# Patient Record
Sex: MALE | Race: WHITE | NOT HISPANIC OR LATINO | Employment: FULL TIME | URBAN - METROPOLITAN AREA
[De-identification: names, ages, dates, MRNs, and addresses within clinical notes are randomized per-mention and may not be internally consistent; named-entity substitution may affect disease eponyms.]

---

## 2023-04-17 ENCOUNTER — OFFICE VISIT (OUTPATIENT)
Dept: PRIMARY CARE | Facility: CLINIC | Age: 63
End: 2023-04-17
Payer: COMMERCIAL

## 2023-04-17 VITALS
BODY MASS INDEX: 32.64 KG/M2 | DIASTOLIC BLOOD PRESSURE: 80 MMHG | WEIGHT: 228 LBS | SYSTOLIC BLOOD PRESSURE: 138 MMHG | HEIGHT: 70 IN

## 2023-04-17 DIAGNOSIS — H60.541 ECZEMATOID OTITIS EXTERNA OF RIGHT EAR, UNSPECIFIED CHRONICITY: Primary | ICD-10-CM

## 2023-04-17 DIAGNOSIS — F32.1 CURRENT MODERATE EPISODE OF MAJOR DEPRESSIVE DISORDER WITHOUT PRIOR EPISODE (MULTI): ICD-10-CM

## 2023-04-17 DIAGNOSIS — J01.00 ACUTE MAXILLARY SINUSITIS, RECURRENCE NOT SPECIFIED: ICD-10-CM

## 2023-04-17 DIAGNOSIS — S39.012A STRAIN OF LUMBAR REGION, INITIAL ENCOUNTER: ICD-10-CM

## 2023-04-17 PROBLEM — G89.29 CHRONIC BILATERAL LOW BACK PAIN WITHOUT SCIATICA: Status: ACTIVE | Noted: 2023-04-17

## 2023-04-17 PROBLEM — M79.604 PAIN OF RIGHT LOWER EXTREMITY: Status: ACTIVE | Noted: 2023-04-17

## 2023-04-17 PROBLEM — N52.9 ERECTILE DYSFUNCTION: Status: ACTIVE | Noted: 2023-04-17

## 2023-04-17 PROBLEM — S29.019A THORACIC MYOFASCIAL STRAIN: Status: ACTIVE | Noted: 2023-04-17

## 2023-04-17 PROBLEM — E53.8 VITAMIN B12 DEFICIENCY: Status: ACTIVE | Noted: 2023-04-17

## 2023-04-17 PROBLEM — M65.30 TRIGGER FINGER, ACQUIRED: Status: ACTIVE | Noted: 2023-04-17

## 2023-04-17 PROBLEM — R20.2 NUMBNESS AND TINGLING IN LEFT HAND: Status: ACTIVE | Noted: 2023-04-17

## 2023-04-17 PROBLEM — F41.9 ANXIETY: Status: ACTIVE | Noted: 2023-04-17

## 2023-04-17 PROBLEM — R73.01 IMPAIRED FASTING GLUCOSE: Status: ACTIVE | Noted: 2023-04-17

## 2023-04-17 PROBLEM — J32.9 SINUSITIS: Status: ACTIVE | Noted: 2023-04-17

## 2023-04-17 PROBLEM — K21.9 GERD (GASTROESOPHAGEAL REFLUX DISEASE): Status: ACTIVE | Noted: 2023-04-17

## 2023-04-17 PROBLEM — R07.1 CHEST PAIN ON BREATHING: Status: ACTIVE | Noted: 2023-04-17

## 2023-04-17 PROBLEM — R07.89 ATYPICAL CHEST PAIN: Status: ACTIVE | Noted: 2023-04-17

## 2023-04-17 PROBLEM — N40.1 ENLARGED PROSTATE WITH LOWER URINARY TRACT SYMPTOMS (LUTS): Status: ACTIVE | Noted: 2023-04-17

## 2023-04-17 PROBLEM — F32.0 DEPRESSION, MAJOR, SINGLE EPISODE, MILD (CMS-HCC): Status: ACTIVE | Noted: 2023-04-17

## 2023-04-17 PROBLEM — M79.674 PAIN OF TOE OF RIGHT FOOT: Status: ACTIVE | Noted: 2023-04-17

## 2023-04-17 PROBLEM — M25.512 LEFT SHOULDER PAIN: Status: ACTIVE | Noted: 2023-04-17

## 2023-04-17 PROBLEM — M54.16 LUMBAR RADICULOPATHY: Status: ACTIVE | Noted: 2023-04-17

## 2023-04-17 PROBLEM — M54.50 CHRONIC BILATERAL LOW BACK PAIN WITHOUT SCIATICA: Status: ACTIVE | Noted: 2023-04-17

## 2023-04-17 PROBLEM — L30.1 DYSHIDROTIC ECZEMA: Status: ACTIVE | Noted: 2023-04-17

## 2023-04-17 PROBLEM — M10.9 GOUT: Status: ACTIVE | Noted: 2023-04-17

## 2023-04-17 PROBLEM — G57.51 TARSAL TUNNEL SYNDROME OF RIGHT SIDE: Status: ACTIVE | Noted: 2023-04-17

## 2023-04-17 PROBLEM — I10 BENIGN ESSENTIAL HYPERTENSION: Status: ACTIVE | Noted: 2023-04-17

## 2023-04-17 PROBLEM — E78.5 HYPERLIPIDEMIA: Status: ACTIVE | Noted: 2023-04-17

## 2023-04-17 PROBLEM — R20.0 NUMBNESS: Status: ACTIVE | Noted: 2023-04-17

## 2023-04-17 PROBLEM — M94.0 COSTOCHONDRITIS: Status: ACTIVE | Noted: 2023-04-17

## 2023-04-17 PROBLEM — N45.1 EPIDIDYMITIS: Status: ACTIVE | Noted: 2023-04-17

## 2023-04-17 PROBLEM — B35.6 TINEA CRURIS: Status: ACTIVE | Noted: 2023-04-17

## 2023-04-17 PROBLEM — R05.9 COUGH: Status: ACTIVE | Noted: 2023-04-17

## 2023-04-17 PROBLEM — J34.3 HYPERTROPHY OF INFERIOR NASAL TURBINATE: Status: ACTIVE | Noted: 2023-04-17

## 2023-04-17 PROBLEM — R20.2 PARESTHESIA OF FOOT: Status: ACTIVE | Noted: 2023-04-17

## 2023-04-17 PROBLEM — R20.0 NUMBNESS AND TINGLING IN LEFT HAND: Status: ACTIVE | Noted: 2023-04-17

## 2023-04-17 PROBLEM — H26.9 LEFT CATARACT: Status: ACTIVE | Noted: 2023-04-17

## 2023-04-17 PROBLEM — G62.9 PERIPHERAL NEUROPATHY: Status: ACTIVE | Noted: 2023-04-17

## 2023-04-17 PROBLEM — M79.605 PAIN OF LEFT LOWER EXTREMITY: Status: ACTIVE | Noted: 2023-04-17

## 2023-04-17 PROCEDURE — 3075F SYST BP GE 130 - 139MM HG: CPT | Performed by: INTERNAL MEDICINE

## 2023-04-17 PROCEDURE — 3079F DIAST BP 80-89 MM HG: CPT | Performed by: INTERNAL MEDICINE

## 2023-04-17 PROCEDURE — 99214 OFFICE O/P EST MOD 30 MIN: CPT | Performed by: INTERNAL MEDICINE

## 2023-04-17 PROCEDURE — 1036F TOBACCO NON-USER: CPT | Performed by: INTERNAL MEDICINE

## 2023-04-17 RX ORDER — ROSUVASTATIN CALCIUM 10 MG/1
1 TABLET, COATED ORAL DAILY
COMMUNITY
Start: 2017-03-27 | End: 2023-05-08 | Stop reason: SDUPTHER

## 2023-04-17 RX ORDER — BUPROPION HYDROCHLORIDE 300 MG/1
1 TABLET ORAL DAILY
COMMUNITY
Start: 2013-06-10 | End: 2023-05-08 | Stop reason: SDUPTHER

## 2023-04-17 RX ORDER — ALLOPURINOL 300 MG/1
1 TABLET ORAL DAILY
COMMUNITY
Start: 2021-06-04 | End: 2023-05-08 | Stop reason: SDUPTHER

## 2023-04-17 RX ORDER — GABAPENTIN 300 MG/1
CAPSULE ORAL
COMMUNITY
Start: 2022-07-14

## 2023-04-17 RX ORDER — VENLAFAXINE HYDROCHLORIDE 75 MG/1
75 CAPSULE, EXTENDED RELEASE ORAL DAILY
Qty: 30 CAPSULE | Refills: 1 | Status: SHIPPED | OUTPATIENT
Start: 2023-04-17 | End: 2023-05-08 | Stop reason: SDUPTHER

## 2023-04-17 RX ORDER — CARVEDILOL 12.5 MG/1
1 TABLET ORAL
COMMUNITY
Start: 2013-06-10 | End: 2023-05-08 | Stop reason: SDUPTHER

## 2023-04-17 RX ORDER — FLUTICASONE PROPIONATE 50 MCG
2 SPRAY, SUSPENSION (ML) NASAL DAILY
COMMUNITY
Start: 2022-06-21

## 2023-04-17 RX ORDER — CALCIUM CARB/VITAMIN D3/VIT K1 500-500-40
TABLET,CHEWABLE ORAL
COMMUNITY
Start: 2019-10-07

## 2023-04-17 RX ORDER — METHYLPREDNISOLONE 4 MG/1
TABLET ORAL
Qty: 21 TABLET | Refills: 0 | Status: SHIPPED | OUTPATIENT
Start: 2023-04-17 | End: 2023-04-24

## 2023-04-17 RX ORDER — SILDENAFIL 100 MG/1
TABLET, FILM COATED ORAL
COMMUNITY
Start: 2019-02-14

## 2023-04-17 RX ORDER — NEOMYCIN SULFATE, POLYMYXIN B SULFATE, HYDROCORTISONE 3.5; 10000; 1 MG/ML; [USP'U]/ML; MG/ML
2 SOLUTION/ DROPS AURICULAR (OTIC) 3 TIMES DAILY
Qty: 10 ML | Refills: 0 | Status: SHIPPED | OUTPATIENT
Start: 2023-04-17 | End: 2023-04-24

## 2023-04-17 RX ORDER — OMEPRAZOLE 20 MG/1
1 CAPSULE, DELAYED RELEASE ORAL DAILY
COMMUNITY
Start: 2021-12-13 | End: 2023-05-08 | Stop reason: SDUPTHER

## 2023-04-17 RX ORDER — AMOXICILLIN AND CLAVULANATE POTASSIUM 875; 125 MG/1; MG/1
875 TABLET, FILM COATED ORAL 2 TIMES DAILY
Qty: 20 TABLET | Refills: 0 | Status: SHIPPED | OUTPATIENT
Start: 2023-04-17 | End: 2023-04-27

## 2023-04-17 RX ORDER — SERTRALINE HYDROCHLORIDE 50 MG/1
0.5 TABLET, FILM COATED ORAL DAILY
COMMUNITY
Start: 2020-11-11 | End: 2023-05-08 | Stop reason: SDUPTHER

## 2023-04-17 ASSESSMENT — PATIENT HEALTH QUESTIONNAIRE - PHQ9
SUM OF ALL RESPONSES TO PHQ9 QUESTIONS 1 AND 2: 2
1. LITTLE INTEREST OR PLEASURE IN DOING THINGS: SEVERAL DAYS
2. FEELING DOWN, DEPRESSED OR HOPELESS: SEVERAL DAYS

## 2023-04-17 ASSESSMENT — ENCOUNTER SYMPTOMS: BACK PAIN: 1

## 2023-04-17 NOTE — PROGRESS NOTES
Subjective   Patient ID: Durga Romero is a 62 y.o. male who presents for Earache and Back Pain.  His right ear has been hurting, gets so inflammed cant put the phone into it.   3 times in the last few months and really bad  Gets a little drainage from the ear  He has been sore under the right eye   Mild nasal drainage    His back, is walking hunched over for 3 days  He was doing some yard work, but no injury  No pain down the leg,  no numbness  No change in bowels or urine    Mid February, he turned 62 and lost is job, has been very depressed and losing sleep over it.   A lot of anxiety, has been discouraged  At work he go more responsibilities and then they let him go, was able to go on wifes insurance and he was given severance.         Earache     Back Pain        Review of Systems   HENT:  Positive for ear pain.    Musculoskeletal:  Positive for back pain.       Objective     Visit Vitals  /80        Physical Exam  HENT:      Ears:      Comments: Right eac with slight scale but not edematous or discharge     Nose:      Comments: Turbs red b/l  Right side with yellow green discharge  Musculoskeletal:      Comments: Lumbar paraspinal muscles with ropiness and tender to palp   Psychiatric:      Comments: Phq9 is 19         Assessment/Plan     Problem List Items Addressed This Visit    None            There are no Patient Instructions on file for this visit.    For all testing, if you have not received results within 5 business days, please call the office.    All results will be available as well on [employer]. Make sure you have signed up.

## 2023-04-17 NOTE — PATIENT INSTRUCTIONS
Right outer ear infection (eczematous otitis)  Use cortisporin drops, 4 drops right ear 3-4 times a day    Right maxillary sinus infection- take augmentin 875mg twice daily for 10 days  Call if rash or diarrhea    Depression, poorly controlled (PHQ9 16)  Continue bupropion 300mg daily  Add effexor/venlafaxine xr 75mg daily  Recheck in 1 month    Lumbar strain- take medrol sky and do lower back exercises

## 2023-05-08 DIAGNOSIS — F41.9 ANXIETY: ICD-10-CM

## 2023-05-08 DIAGNOSIS — E78.5 HYPERLIPIDEMIA, UNSPECIFIED HYPERLIPIDEMIA TYPE: ICD-10-CM

## 2023-05-08 DIAGNOSIS — K21.9 GASTROESOPHAGEAL REFLUX DISEASE, UNSPECIFIED WHETHER ESOPHAGITIS PRESENT: ICD-10-CM

## 2023-05-08 DIAGNOSIS — I10 BENIGN ESSENTIAL HYPERTENSION: ICD-10-CM

## 2023-05-08 DIAGNOSIS — S39.012A STRAIN OF LUMBAR REGION, INITIAL ENCOUNTER: ICD-10-CM

## 2023-05-08 DIAGNOSIS — F32.1 CURRENT MODERATE EPISODE OF MAJOR DEPRESSIVE DISORDER WITHOUT PRIOR EPISODE (MULTI): ICD-10-CM

## 2023-05-08 RX ORDER — ALLOPURINOL 300 MG/1
300 TABLET ORAL DAILY
Qty: 90 TABLET | Refills: 1 | Status: SHIPPED | OUTPATIENT
Start: 2023-05-08 | End: 2023-10-23

## 2023-05-08 RX ORDER — ROSUVASTATIN CALCIUM 10 MG/1
10 TABLET, COATED ORAL DAILY
Qty: 90 TABLET | Refills: 1 | Status: SHIPPED | OUTPATIENT
Start: 2023-05-08 | End: 2023-10-23

## 2023-05-08 RX ORDER — OMEPRAZOLE 20 MG/1
20 CAPSULE, DELAYED RELEASE ORAL DAILY
Qty: 90 CAPSULE | Refills: 0 | Status: SHIPPED | OUTPATIENT
Start: 2023-05-08 | End: 2023-06-12

## 2023-05-08 RX ORDER — VENLAFAXINE HYDROCHLORIDE 75 MG/1
75 CAPSULE, EXTENDED RELEASE ORAL DAILY
Qty: 30 CAPSULE | Refills: 1 | Status: SHIPPED | OUTPATIENT
Start: 2023-05-08 | End: 2023-08-24

## 2023-05-08 RX ORDER — SERTRALINE HYDROCHLORIDE 50 MG/1
25 TABLET, FILM COATED ORAL DAILY
Qty: 90 TABLET | Refills: 1 | Status: SHIPPED | OUTPATIENT
Start: 2023-05-08 | End: 2023-08-09 | Stop reason: ALTCHOICE

## 2023-05-08 RX ORDER — BUPROPION HYDROCHLORIDE 300 MG/1
300 TABLET ORAL DAILY
Qty: 90 TABLET | Refills: 1 | Status: SHIPPED | OUTPATIENT
Start: 2023-05-08 | End: 2023-10-23

## 2023-05-08 RX ORDER — CARVEDILOL 12.5 MG/1
12.5 TABLET ORAL
Qty: 90 TABLET | Refills: 0 | Status: SHIPPED | OUTPATIENT
Start: 2023-05-08 | End: 2023-06-12

## 2023-05-08 NOTE — TELEPHONE ENCOUNTER
Requested Prescriptions     Pending Prescriptions Disp Refills    venlafaxine XR (Effexor-XR) 75 mg 24 hr capsule 30 capsule 1     Sig: Take 1 capsule (75 mg) by mouth once daily. Take with food.    rosuvastatin (Crestor) 10 mg tablet 90 tablet 1     Sig: Take 1 tablet (10 mg) by mouth once daily.    buPROPion XL (Wellbutrin XL) 300 mg 24 hr tablet 90 tablet 1     Sig: Take 1 tablet (300 mg) by mouth once daily.    carvedilol (Coreg) 12.5 mg tablet 90 tablet 0     Sig: Take 1 tablet (12.5 mg) by mouth 2 times a day with meals.    omeprazole (PriLOSEC) 20 mg DR capsule 90 capsule 0     Sig: Take 1 capsule (20 mg) by mouth once daily.    allopurinol (Zyloprim) 300 mg tablet 90 tablet 1     Sig: Take 1 tablet (300 mg) by mouth once daily.    sertraline (Zoloft) 50 mg tablet 90 tablet 1     Sig: Take 0.5 tablets (25 mg) by mouth once daily.

## 2023-05-10 ENCOUNTER — HOSPITAL ENCOUNTER (OUTPATIENT)
Dept: DATA CONVERSION | Facility: HOSPITAL | Age: 63
End: 2023-05-10
Attending: ORTHOPAEDIC SURGERY | Admitting: ORTHOPAEDIC SURGERY
Payer: COMMERCIAL

## 2023-05-10 DIAGNOSIS — E78.5 HYPERLIPIDEMIA, UNSPECIFIED: ICD-10-CM

## 2023-05-10 DIAGNOSIS — G56.22 LESION OF ULNAR NERVE, LEFT UPPER LIMB: ICD-10-CM

## 2023-05-10 DIAGNOSIS — I10 ESSENTIAL (PRIMARY) HYPERTENSION: ICD-10-CM

## 2023-05-10 DIAGNOSIS — G47.33 OBSTRUCTIVE SLEEP APNEA (ADULT) (PEDIATRIC): ICD-10-CM

## 2023-05-10 DIAGNOSIS — G56.02 CARPAL TUNNEL SYNDROME, LEFT UPPER LIMB: ICD-10-CM

## 2023-05-10 DIAGNOSIS — N40.0 BENIGN PROSTATIC HYPERPLASIA WITHOUT LOWER URINARY TRACT SYMPTOMS: ICD-10-CM

## 2023-05-10 DIAGNOSIS — F32.A DEPRESSION, UNSPECIFIED: ICD-10-CM

## 2023-06-09 DIAGNOSIS — F32.1 CURRENT MODERATE EPISODE OF MAJOR DEPRESSIVE DISORDER WITHOUT PRIOR EPISODE (MULTI): ICD-10-CM

## 2023-06-09 DIAGNOSIS — E78.5 HYPERLIPIDEMIA, UNSPECIFIED HYPERLIPIDEMIA TYPE: ICD-10-CM

## 2023-06-09 DIAGNOSIS — F41.9 ANXIETY: ICD-10-CM

## 2023-06-09 DIAGNOSIS — S39.012A STRAIN OF LUMBAR REGION, INITIAL ENCOUNTER: ICD-10-CM

## 2023-06-09 DIAGNOSIS — I10 BENIGN ESSENTIAL HYPERTENSION: ICD-10-CM

## 2023-06-09 DIAGNOSIS — K21.9 GASTROESOPHAGEAL REFLUX DISEASE, UNSPECIFIED WHETHER ESOPHAGITIS PRESENT: ICD-10-CM

## 2023-06-12 RX ORDER — CARVEDILOL 12.5 MG/1
TABLET ORAL
Qty: 180 TABLET | Refills: 1 | Status: SHIPPED | OUTPATIENT
Start: 2023-06-12 | End: 2023-12-07

## 2023-06-12 RX ORDER — OMEPRAZOLE 20 MG/1
CAPSULE, DELAYED RELEASE ORAL
Qty: 90 CAPSULE | Refills: 0 | Status: SHIPPED | OUTPATIENT
Start: 2023-06-12 | End: 2023-10-23

## 2023-08-09 ENCOUNTER — OFFICE VISIT (OUTPATIENT)
Dept: PRIMARY CARE | Facility: CLINIC | Age: 63
End: 2023-08-09
Payer: COMMERCIAL

## 2023-08-09 VITALS
HEIGHT: 70 IN | SYSTOLIC BLOOD PRESSURE: 140 MMHG | BODY MASS INDEX: 31.78 KG/M2 | HEART RATE: 70 BPM | DIASTOLIC BLOOD PRESSURE: 84 MMHG | WEIGHT: 222 LBS

## 2023-08-09 DIAGNOSIS — J01.00 ACUTE MAXILLARY SINUSITIS, RECURRENCE NOT SPECIFIED: Primary | ICD-10-CM

## 2023-08-09 DIAGNOSIS — L23.7 POISON IVY DERMATITIS: ICD-10-CM

## 2023-08-09 PROCEDURE — 1036F TOBACCO NON-USER: CPT | Performed by: INTERNAL MEDICINE

## 2023-08-09 PROCEDURE — 3077F SYST BP >= 140 MM HG: CPT | Performed by: INTERNAL MEDICINE

## 2023-08-09 PROCEDURE — 99213 OFFICE O/P EST LOW 20 MIN: CPT | Performed by: INTERNAL MEDICINE

## 2023-08-09 PROCEDURE — 3079F DIAST BP 80-89 MM HG: CPT | Performed by: INTERNAL MEDICINE

## 2023-08-09 RX ORDER — AMOXICILLIN AND CLAVULANATE POTASSIUM 875; 125 MG/1; MG/1
875 TABLET, FILM COATED ORAL 2 TIMES DAILY
Qty: 20 TABLET | Refills: 0 | Status: SHIPPED | OUTPATIENT
Start: 2023-08-09 | End: 2023-08-09 | Stop reason: SDUPTHER

## 2023-08-09 RX ORDER — AMOXICILLIN AND CLAVULANATE POTASSIUM 875; 125 MG/1; MG/1
875 TABLET, FILM COATED ORAL 2 TIMES DAILY
Qty: 20 TABLET | Refills: 0 | Status: SHIPPED | OUTPATIENT
Start: 2023-08-09 | End: 2023-08-19

## 2023-08-09 RX ORDER — METHYLPREDNISOLONE 4 MG/1
TABLET ORAL
Qty: 21 TABLET | Refills: 0 | Status: SHIPPED | OUTPATIENT
Start: 2023-08-09 | End: 2023-08-09 | Stop reason: SDUPTHER

## 2023-08-09 RX ORDER — METHYLPREDNISOLONE 4 MG/1
TABLET ORAL
Qty: 21 TABLET | Refills: 0 | Status: SHIPPED | OUTPATIENT
Start: 2023-08-09 | End: 2023-08-16

## 2023-08-09 NOTE — PROGRESS NOTES
Subjective   Patient ID: Durga Romero is a 63 y.o. male who presents for Sinusitis.    HPI     Review of Systems    Objective   There were no vitals taken for this visit.    Physical Exam    Assessment/Plan

## 2023-08-09 NOTE — PATIENT INSTRUCTIONS
Sinusitis, take augmentin 875mg twice daily for 10 days  Can make your bowels loose but if any diarrhea or rash, call me    Poison ivy  Wash all clothes, shoes and tools that may have come into contact with the plant oils  Take zyrtec/cetirizine 10mg daily  Take medrol dose sky as directed

## 2023-08-09 NOTE — PROGRESS NOTES
"                           Subjective   Patient ID: Durga Romero is a 63 y.o. male who presents for Sinusitis.    Has been sick for 10 days  Coughing a lot  Sore throat  Dark green to brown phlegm  Has a sore behind his front teeth,   He gets left sided stabbing headaches    Not sob  No fever  Last couple months gets episodes where his bone hurt.    He covid tested and was negative.     Also has an itchy rash on his forearms, using topical therapies without benefit    Sinusitis         Review of Systems    Objective   /84   Pulse 70   Ht 1.778 m (5' 10\")   Wt 101 kg (222 lb)   BMI 31.85 kg/m²     Physical Exam  Constitutional:       Appearance: Normal appearance.   HENT:      Right Ear: Tympanic membrane normal.      Left Ear: Tympanic membrane normal.      Nose:      Comments: Turbs are red with yellow crusting     Mouth/Throat:      Pharynx: Posterior oropharyngeal erythema present. No oropharyngeal exudate.   Neck:      Vascular: No carotid bruit.   Cardiovascular:      Rate and Rhythm: Normal rate and regular rhythm.   Pulmonary:      Effort: Pulmonary effort is normal.      Breath sounds: Normal breath sounds.   Lymphadenopathy:      Cervical: No cervical adenopathy.   Skin:     Comments: Right arm with hives and linear areas of ruptured prior blisters   Neurological:      Mental Status: He is alert.         Assessment/Plan          Patient Instructions   Sinusitis, take augmentin 875mg twice daily for 10 days  Can make your bowels loose but if any diarrhea or rash, call me    Poison ivy  Wash all clothes, shoes and tools that may have come into contact with the plant oils  Take zyrtec/cetirizine 10mg daily  Take medrol dose sky as directed   "

## 2023-08-24 DIAGNOSIS — F41.9 ANXIETY: ICD-10-CM

## 2023-08-24 DIAGNOSIS — S39.012A STRAIN OF LUMBAR REGION, INITIAL ENCOUNTER: ICD-10-CM

## 2023-08-24 DIAGNOSIS — K21.9 GASTROESOPHAGEAL REFLUX DISEASE, UNSPECIFIED WHETHER ESOPHAGITIS PRESENT: ICD-10-CM

## 2023-08-24 DIAGNOSIS — I10 BENIGN ESSENTIAL HYPERTENSION: ICD-10-CM

## 2023-08-24 DIAGNOSIS — F32.1 CURRENT MODERATE EPISODE OF MAJOR DEPRESSIVE DISORDER WITHOUT PRIOR EPISODE (MULTI): ICD-10-CM

## 2023-08-24 DIAGNOSIS — E78.5 HYPERLIPIDEMIA, UNSPECIFIED HYPERLIPIDEMIA TYPE: ICD-10-CM

## 2023-08-24 RX ORDER — VENLAFAXINE HYDROCHLORIDE 75 MG/1
75 CAPSULE, EXTENDED RELEASE ORAL DAILY
Qty: 90 CAPSULE | Refills: 1 | Status: SHIPPED | OUTPATIENT
Start: 2023-08-24 | End: 2023-09-25

## 2023-08-24 NOTE — TELEPHONE ENCOUNTER
Requested Prescriptions     Pending Prescriptions Disp Refills    venlafaxine XR (Effexor-XR) 75 mg 24 hr capsule [Pharmacy Med Name: VENLAFAXINE  CAP 75MG ER] 90 capsule 1     Sig: TAKE 1 CAPSULE ONCE DAILY  WITH FOOD

## 2023-09-14 NOTE — H&P
History of Present Illness:   History Present Illness:  Reason for surgery: left carpal tunnel and cubital  tunnel syndrome   HPI:    62-year-old right-hand-dominant male presents for evaluation of left hand numbness and tingling. Symptoms began about 3 months ago. He has had constant numbness and  tingling into his small and ring fingers that is intermittently worse. He has tried overnight towel splinting with minimal relief of symptoms. He also feels some degree of decreased  strength. He does not recall specific symptoms into radial 3 digits.  Right side is currently asymptomatic.    Allergies:        Allergies:  ·  No Known Allergies :     Home Medication Review:   Home Medications Reviewed: yes     Impression/Procedure:   ·  Impression and Planned Procedure: left open carpal tunnel release and ulnar nerve decompression with possible transposition at the elbow       ERAS (Enhanced Recovery After Surgery):  ·  ERAS Patient: no       Physical Exam by System:    Respiratory/Thorax: Patent airways, CTAB, normal  breath sounds with good chest expansion, thorax symmetric   Cardiovascular: Regular, rate and rhythm, no murmurs,  2+ equal pulses of the extremities, normal S 1and S 2     Consent:   COVID-19 Consent:  ·  COVID-19 Risk Consent Surgeon has reviewed key risks related to the risk of jennifer COVID-19 and if they contract COVID-19 what the risks are.     Attestation:   Note Completion:  I am a:  Resident/Fellow   Attending Attestation I saw and evaluated the patient.  I personally obtained the key and critical portions of the history and physical exam or was physically present for key and  critical portions performed by the resident/fellow. I reviewed the resident/fellow?s documentation and discussed the patient with the resident/fellow.  I agree with the resident/fellow?s medical decision making as documented in the note.     I personally evaluated the patient on 10-May-2023         Electronic  Signatures:  Viktor Klein (DO (Resident))  (Signed 10-May-2023 06:33)   Authored: History of Present Illness, Allergies, Home  Medication Review, Impression/Procedure, ERAS, Physical Exam, Consent, Note Completion  Alexx Schulte)  (Signed 10-May-2023 14:42)   Authored: Note Completion   Co-Signer: History of Present Illness, Allergies, Home Medication Review, Impression/Procedure, ERAS, Physical Exam, Consent, Note Completion      Last Updated: 10-May-2023 14:42 by Alexx Schulte)

## 2023-09-25 DIAGNOSIS — E78.5 DYSLIPIDEMIA, GOAL LDL BELOW 130: Primary | ICD-10-CM

## 2023-09-25 DIAGNOSIS — M1A.9XX0 CHRONIC GOUT WITHOUT TOPHUS, UNSPECIFIED CAUSE, UNSPECIFIED SITE: ICD-10-CM

## 2023-09-25 DIAGNOSIS — E53.8 VITAMIN B12 DEFICIENCY: ICD-10-CM

## 2023-09-25 DIAGNOSIS — R39.11 BENIGN PROSTATIC HYPERPLASIA WITH URINARY HESITANCY: ICD-10-CM

## 2023-09-25 DIAGNOSIS — N40.1 BENIGN PROSTATIC HYPERPLASIA WITH URINARY HESITANCY: ICD-10-CM

## 2023-09-25 DIAGNOSIS — R73.01 IMPAIRED FASTING GLUCOSE: ICD-10-CM

## 2023-10-02 NOTE — OP NOTE
Post Operative Note:     PreOp Diagnosis: Left carpal tunnel syndrome and  left ulnar neuropathy   Post-Procedure Diagnosis: Left carpal tunnel syndrome  and left ulnar neuropathy   Procedure: 1.  Left open carpal tunnel release  2.  Left elbow ulnar nerve decompression with anterior subcutaneous transposition   Surgeon: Eris   Resident/Fellow/Other Assistant: Latoya   Anesthesia: General   Estimated Blood Loss (mL): Minimal   Specimen: no   Findings: See below   Patient Returned To/Condition: PACU/good     Operative Report Dictated:  Dictation: not applicable - note contains Operative  Report   Operative Report:    Indications: Patient with progressive left carpal tunnel symptoms as well as left ulnar neuropathy.  We discussed risks and benefits of continued nonoperative versus  operative treatment options.  After thoroughly reviewing patient wished to proceed with left carpal tunnel release as well as left elbow ulnar nerve decompression with plan for transposition.  We did discuss that primary goal of surgery is to prevent  further worsening.  Is likely he will still have some degree of residual symptoms given the severity of his preoperative symptoms.  Expected operative and postoperative course was reviewed and questions addressed.    Operative course: Patient was greeted in the preoperative holding area and the operative sites were marked with indelible marker.  He was brought back to the operating room suite where general anesthetic was induced by the anesthesia team.  Left upper  extremity was prepped and draped in standard sterile fashion timeout procedure was performed as per standard protocol.  Esmarch was used to exsanguinate left upper extremity and upper arm tourniquet was inflated.  Attention was first taken to the carpal  tunnel release.  Longitudinal incision was made in line with the radial border the ring finger overlying the level of the transverse carpal ligament.  Gentle spreading was  carried out through the palmar fascia and the transverse carpal ligament was identified  and sharply released in a distal to proximal fashion under direct visualization.  Complete release proximally was verified visually as well as by palpation.  Gentle spreading distally also confirmed complete release.  Median nerve was inspected and confirmed  to be fully intact.  Wound was then irrigated and reapproximated with 4-0 nylon suture.  Attention was then taken to the cubital tunnel release.    A curvilinear incision was made longitudinally centered between the medial epicondyle and the olecranon.  Gentle spreading was carried down through the subcutaneous tissues and a sensory nerve branch was identified and protected with gentle blunt retraction  throughout the remainder of the case.  A small pocket was made immediately overlying the medial epicondyle for later transposition of the ulnar nerve.  The ulnar nerve was identified immediately posterior to the medial epicondyle and dissection was carried  out distally.  The superficial fascia between the 2 halves of the FCU was identified and sharply released under direct visualization.  Gentle spreading was then carried out between the 2 heads of the FCU which allowed for visualization of the deep fascia  which was then also released under direct visualization, taking care to protect the motor nerve branches.  Dissection of the ulnar nerve was then carried out proximally.  Fuller's fascia was identified and released while protecting the underlying nerve.   Gentle spreading proximally also allowed for visualization of the arcade of [place] which was also released while protecting the underlying ulnar nerve.  The medial intermuscular septum was then identified and sharply excised while protecting the underlying  venous plexus.  The ulnar nerve was then able to be transposed anteriorly.  Inspection proximally and distally confirmed no points of kinking or compression.  A  chromic suture was placed into the fascia overlying the medial epicondyle to the overlying  skin flap, taking care to continue protecting the sensory nerve branch that had already been identified as well as the ulnar nerve.  Final inspection of the ulnar nerve confirmed no additional points of compression.  Wound was then irrigated and reapproximated  with 3-0 Monocryl in a buried interrupted fashion followed by a running 3-0 VueLock subcuticular stitch.  Dry sterile dressings were applied after Marcaine was infiltrated about incision sites for postoperative analgesic relief.  Long-arm splint was applied  with plan to remove at 2-week follow-up appointment.  Patient was awoken from anesthesia uneventfully and taken the recovery for further care.    He will follow-up in 2 weeks for wound check and likely suture removal.    Attestation:   Note Completion:  Attending Attestation I was present for the entire procedure         Electronic Signatures:  Alexx Schulte)  (Signed 10-May-2023 14:55)   Authored: Post Operative Note, Note Completion      Last Updated: 10-May-2023 14:55 by Alexx Schulte)

## 2023-10-03 ENCOUNTER — LAB (OUTPATIENT)
Dept: LAB | Facility: LAB | Age: 63
End: 2023-10-03
Payer: COMMERCIAL

## 2023-10-03 ENCOUNTER — OFFICE VISIT (OUTPATIENT)
Dept: PRIMARY CARE | Facility: CLINIC | Age: 63
End: 2023-10-03
Payer: COMMERCIAL

## 2023-10-03 VITALS
BODY MASS INDEX: 30.78 KG/M2 | HEIGHT: 70 IN | HEART RATE: 58 BPM | OXYGEN SATURATION: 98 % | WEIGHT: 215 LBS | SYSTOLIC BLOOD PRESSURE: 133 MMHG | DIASTOLIC BLOOD PRESSURE: 76 MMHG

## 2023-10-03 DIAGNOSIS — E78.00 PURE HYPERCHOLESTEROLEMIA: ICD-10-CM

## 2023-10-03 DIAGNOSIS — R39.11 BENIGN PROSTATIC HYPERPLASIA WITH URINARY HESITANCY: ICD-10-CM

## 2023-10-03 DIAGNOSIS — M25.511 ACUTE PAIN OF BOTH SHOULDERS: Primary | ICD-10-CM

## 2023-10-03 DIAGNOSIS — M25.512 ACUTE PAIN OF BOTH SHOULDERS: ICD-10-CM

## 2023-10-03 DIAGNOSIS — M25.512 ACUTE PAIN OF BOTH SHOULDERS: Primary | ICD-10-CM

## 2023-10-03 DIAGNOSIS — F32.0 DEPRESSION, MAJOR, SINGLE EPISODE, MILD (CMS-HCC): ICD-10-CM

## 2023-10-03 DIAGNOSIS — E78.5 DYSLIPIDEMIA, GOAL LDL BELOW 130: ICD-10-CM

## 2023-10-03 DIAGNOSIS — R73.01 IMPAIRED FASTING GLUCOSE: ICD-10-CM

## 2023-10-03 DIAGNOSIS — R07.89 ATYPICAL CHEST PAIN: ICD-10-CM

## 2023-10-03 DIAGNOSIS — M25.511 ACUTE PAIN OF BOTH SHOULDERS: ICD-10-CM

## 2023-10-03 DIAGNOSIS — N40.1 BENIGN PROSTATIC HYPERPLASIA WITH URINARY HESITANCY: ICD-10-CM

## 2023-10-03 DIAGNOSIS — M1A.9XX0 CHRONIC GOUT WITHOUT TOPHUS, UNSPECIFIED CAUSE, UNSPECIFIED SITE: ICD-10-CM

## 2023-10-03 LAB
ALBUMIN SERPL BCP-MCNC: 4.2 G/DL (ref 3.4–5)
ALP SERPL-CCNC: 60 U/L (ref 33–136)
ALT SERPL W P-5'-P-CCNC: 17 U/L (ref 10–52)
ANION GAP SERPL CALC-SCNC: 14 MMOL/L (ref 10–20)
AST SERPL W P-5'-P-CCNC: 17 U/L (ref 9–39)
BASOPHILS # BLD AUTO: 0.06 X10*3/UL (ref 0–0.1)
BASOPHILS NFR BLD AUTO: 0.9 %
BILIRUB SERPL-MCNC: 0.6 MG/DL (ref 0–1.2)
BUN SERPL-MCNC: 12 MG/DL (ref 6–23)
CALCIUM SERPL-MCNC: 9.1 MG/DL (ref 8.6–10.3)
CARDIAC TROPONIN I PNL SERPL HS: 5 NG/L (ref 0–20)
CHLORIDE SERPL-SCNC: 106 MMOL/L (ref 98–107)
CHOLEST SERPL-MCNC: 153 MG/DL (ref 0–199)
CHOLESTEROL/HDL RATIO: 3.8
CK SERPL-CCNC: 129 U/L (ref 0–325)
CO2 SERPL-SCNC: 23 MMOL/L (ref 21–32)
CREAT SERPL-MCNC: 1.1 MG/DL (ref 0.5–1.3)
CRP SERPL-MCNC: 0.22 MG/DL
EOSINOPHIL # BLD AUTO: 0.31 X10*3/UL (ref 0–0.7)
EOSINOPHIL NFR BLD AUTO: 4.6 %
ERYTHROCYTE [DISTWIDTH] IN BLOOD BY AUTOMATED COUNT: 13.5 % (ref 11.5–14.5)
ERYTHROCYTE [SEDIMENTATION RATE] IN BLOOD BY WESTERGREN METHOD: 7 MM/H (ref 0–20)
EST. AVERAGE GLUCOSE BLD GHB EST-MCNC: 117 MG/DL
GFR SERPL CREATININE-BSD FRML MDRD: 76 ML/MIN/1.73M*2
GLUCOSE SERPL-MCNC: 102 MG/DL (ref 74–99)
HBA1C MFR BLD: 5.7 %
HCT VFR BLD AUTO: 45.7 % (ref 41–52)
HDLC SERPL-MCNC: 40.3 MG/DL
HGB BLD-MCNC: 15.1 G/DL (ref 13.5–17.5)
IMM GRANULOCYTES # BLD AUTO: 0.02 X10*3/UL (ref 0–0.7)
IMM GRANULOCYTES NFR BLD AUTO: 0.3 % (ref 0–0.9)
LDLC SERPL CALC-MCNC: 71 MG/DL (ref 140–190)
LYMPHOCYTES # BLD AUTO: 1.8 X10*3/UL (ref 1.2–4.8)
LYMPHOCYTES NFR BLD AUTO: 26.6 %
MCH RBC QN AUTO: 31.1 PG (ref 26–34)
MCHC RBC AUTO-ENTMCNC: 33 G/DL (ref 32–36)
MCV RBC AUTO: 94 FL (ref 80–100)
MONOCYTES # BLD AUTO: 0.68 X10*3/UL (ref 0.1–1)
MONOCYTES NFR BLD AUTO: 10.1 %
NEUTROPHILS # BLD AUTO: 3.89 X10*3/UL (ref 1.2–7.7)
NEUTROPHILS NFR BLD AUTO: 57.5 %
NON HDL CHOLESTEROL: 113 MG/DL (ref 0–149)
NRBC BLD-RTO: 0 /100 WBCS (ref 0–0)
PLATELET # BLD AUTO: 199 X10*3/UL (ref 150–450)
PMV BLD AUTO: 10.9 FL (ref 7.5–11.5)
POTASSIUM SERPL-SCNC: 3.9 MMOL/L (ref 3.5–5.3)
PROT SERPL-MCNC: 6.3 G/DL (ref 6.4–8.2)
PSA SERPL-MCNC: 0.78 NG/ML
RBC # BLD AUTO: 4.86 X10*6/UL (ref 4.5–5.9)
SODIUM SERPL-SCNC: 139 MMOL/L (ref 136–145)
TRIGL SERPL-MCNC: 210 MG/DL (ref 0–149)
TSH SERPL-ACNC: 2.63 MIU/L (ref 0.44–3.98)
URATE SERPL-MCNC: 4.8 MG/DL (ref 4–7.5)
VIT B12 SERPL-MCNC: 666 PG/ML (ref 211–911)
VLDL: 42 MG/DL (ref 0–40)
WBC # BLD AUTO: 6.8 X10*3/UL (ref 4.4–11.3)

## 2023-10-03 PROCEDURE — 3075F SYST BP GE 130 - 139MM HG: CPT | Performed by: INTERNAL MEDICINE

## 2023-10-03 PROCEDURE — 93000 ELECTROCARDIOGRAM COMPLETE: CPT | Performed by: INTERNAL MEDICINE

## 2023-10-03 PROCEDURE — 84484 ASSAY OF TROPONIN QUANT: CPT

## 2023-10-03 PROCEDURE — 82550 ASSAY OF CK (CPK): CPT

## 2023-10-03 PROCEDURE — 1036F TOBACCO NON-USER: CPT | Performed by: INTERNAL MEDICINE

## 2023-10-03 PROCEDURE — 36415 COLL VENOUS BLD VENIPUNCTURE: CPT

## 2023-10-03 PROCEDURE — 80061 LIPID PANEL: CPT

## 2023-10-03 PROCEDURE — 99214 OFFICE O/P EST MOD 30 MIN: CPT | Performed by: INTERNAL MEDICINE

## 2023-10-03 PROCEDURE — 84550 ASSAY OF BLOOD/URIC ACID: CPT

## 2023-10-03 PROCEDURE — 86140 C-REACTIVE PROTEIN: CPT

## 2023-10-03 PROCEDURE — 3078F DIAST BP <80 MM HG: CPT | Performed by: INTERNAL MEDICINE

## 2023-10-03 PROCEDURE — 85652 RBC SED RATE AUTOMATED: CPT

## 2023-10-03 PROCEDURE — 82607 VITAMIN B-12: CPT

## 2023-10-03 PROCEDURE — 80050 GENERAL HEALTH PANEL: CPT

## 2023-10-03 PROCEDURE — 83036 HEMOGLOBIN GLYCOSYLATED A1C: CPT

## 2023-10-03 PROCEDURE — 84153 ASSAY OF PSA TOTAL: CPT

## 2023-10-03 RX ORDER — VENLAFAXINE HYDROCHLORIDE 37.5 MG/1
37.5 CAPSULE, EXTENDED RELEASE ORAL DAILY
Qty: 30 CAPSULE | Refills: 0 | Status: SHIPPED | OUTPATIENT
Start: 2023-10-03 | End: 2024-02-20 | Stop reason: SDUPTHER

## 2023-10-03 NOTE — PROGRESS NOTES
"Subjective   Patient ID: Durga Romero is a 63 y.o. male who presents for Chest Pain.    Started a couple weeks ago  Hurts to  a 24can of pop and feels week    His chest hurts, mostly in the upper chest  2 out of 3 night the discomfort wakes him up  Hurts in the shoulders to the elbows b/l and elbows are very painful  No new activities  Hurts in the b/l groins, soreness  No rashes  No joint swelling  Has been lightheaded in the last couple weeks  Feels dizzy like he is losing his balance and that is intermittent  No numbness or tingling in his arms    No neck pain, just high on chest.   Some vision changes, things have been more blurry.  No scalp tendernss    Pt states is more depressed when he takes the venlafaxine and wants to know if bupropion can be increased    Chest Pain          Review of Systems   Cardiovascular:  Positive for chest pain.       Objective   /76   Pulse 58   Ht 1.778 m (5' 10\")   Wt 97.5 kg (215 lb)   SpO2 98%   BMI 30.85 kg/m²     Physical Exam  Cardiovascular:      Rate and Rhythm: Normal rate and regular rhythm.      Heart sounds: No murmur heard.  Musculoskeletal:      Comments: Full rom of b/l shoulders,   No weakness but pain with resisted abduction/internal rotation b/l  Tender over bicep groove  Pain to palp b/l medial and lateral epicondyls, more on the lateral side  No pain to palp in the chest  No pain or muscle ropiness of the neck/cervical spine  Full rom of the hips  No foot drop  No pronator drift  Finger to nose intact    No pain over trap ridge or thoracic paraspinals  No rash   Neurological:      Mental Status: He is oriented to person, place, and time.         Assessment/Plan          Patient Instructions   Pain in the chest,  shoulders/arms and thighs  Checking sed rate to check for polymyalgia rheumatica  Checking muscle enzymes to check for myositis/muscle breakdown  Checking b12 level    Neurologic exam is normal.  EKG is normal    Get labs and call " [phone] to schedule ct cardiac score    You feel that your depression has worsened on venlafaxine so will wean down to 37.5mg daily for 1 month and then discontinue. Call if depression symptoms worsen

## 2023-10-03 NOTE — PATIENT INSTRUCTIONS
Pain in the chest,  shoulders/arms and thighs  Checking sed rate to check for polymyalgia rheumatica  Checking muscle enzymes to check for myositis/muscle breakdown  Checking b12 level    Neurologic exam is normal.  EKG is normal    Get labs and call [phone] to schedule ct cardiac score    You feel that your depression has worsened on venlafaxine so will wean down to 37.5mg daily for 1 month and then discontinue. Call if depression symptoms worsen

## 2023-10-03 NOTE — PROGRESS NOTES
Subjective   Patient ID: Durga Romero is a 63 y.o. male who presents for Chest Pain.    HPI     Review of Systems    Objective   There were no vitals taken for this visit.    Physical Exam    Assessment/Plan

## 2023-10-19 ENCOUNTER — ANCILLARY PROCEDURE (OUTPATIENT)
Dept: RADIOLOGY | Facility: CLINIC | Age: 63
End: 2023-10-19
Payer: COMMERCIAL

## 2023-10-19 DIAGNOSIS — E78.00 PURE HYPERCHOLESTEROLEMIA: ICD-10-CM

## 2023-10-19 DIAGNOSIS — R07.89 ATYPICAL CHEST PAIN: ICD-10-CM

## 2023-10-19 PROCEDURE — 75571 CT HRT W/O DYE W/CA TEST: CPT

## 2023-10-23 DIAGNOSIS — E78.5 HYPERLIPIDEMIA, UNSPECIFIED HYPERLIPIDEMIA TYPE: ICD-10-CM

## 2023-10-23 DIAGNOSIS — S39.012A STRAIN OF LUMBAR REGION, INITIAL ENCOUNTER: ICD-10-CM

## 2023-10-23 DIAGNOSIS — F41.9 ANXIETY: ICD-10-CM

## 2023-10-23 DIAGNOSIS — I10 BENIGN ESSENTIAL HYPERTENSION: ICD-10-CM

## 2023-10-23 DIAGNOSIS — F32.1 CURRENT MODERATE EPISODE OF MAJOR DEPRESSIVE DISORDER WITHOUT PRIOR EPISODE (MULTI): ICD-10-CM

## 2023-10-23 DIAGNOSIS — K21.9 GASTROESOPHAGEAL REFLUX DISEASE, UNSPECIFIED WHETHER ESOPHAGITIS PRESENT: ICD-10-CM

## 2023-10-23 RX ORDER — OMEPRAZOLE 20 MG/1
CAPSULE, DELAYED RELEASE ORAL
Qty: 90 CAPSULE | Refills: 1 | Status: SHIPPED | OUTPATIENT
Start: 2023-10-23 | End: 2024-02-20 | Stop reason: WASHOUT

## 2023-10-23 RX ORDER — ALLOPURINOL 300 MG/1
300 TABLET ORAL DAILY
Qty: 90 TABLET | Refills: 1 | Status: SHIPPED | OUTPATIENT
Start: 2023-10-23 | End: 2024-02-20 | Stop reason: SDUPTHER

## 2023-10-23 RX ORDER — BUPROPION HYDROCHLORIDE 300 MG/1
300 TABLET ORAL DAILY
Qty: 90 TABLET | Refills: 1 | Status: SHIPPED | OUTPATIENT
Start: 2023-10-23 | End: 2024-02-20 | Stop reason: SDUPTHER

## 2023-10-23 RX ORDER — ROSUVASTATIN CALCIUM 10 MG/1
10 TABLET, COATED ORAL DAILY
Qty: 90 TABLET | Refills: 1 | Status: SHIPPED | OUTPATIENT
Start: 2023-10-23 | End: 2024-02-20 | Stop reason: SDUPTHER

## 2023-11-21 ENCOUNTER — OFFICE VISIT (OUTPATIENT)
Dept: PRIMARY CARE | Facility: CLINIC | Age: 63
End: 2023-11-21
Payer: COMMERCIAL

## 2023-11-21 VITALS
WEIGHT: 215 LBS | SYSTOLIC BLOOD PRESSURE: 112 MMHG | HEART RATE: 73 BPM | DIASTOLIC BLOOD PRESSURE: 79 MMHG | HEIGHT: 70 IN | BODY MASS INDEX: 30.78 KG/M2

## 2023-11-21 DIAGNOSIS — S39.012A STRAIN OF LUMBAR REGION, INITIAL ENCOUNTER: Primary | ICD-10-CM

## 2023-11-21 DIAGNOSIS — J32.9 SINUSITIS, UNSPECIFIED CHRONICITY, UNSPECIFIED LOCATION: ICD-10-CM

## 2023-11-21 PROCEDURE — 99213 OFFICE O/P EST LOW 20 MIN: CPT | Performed by: INTERNAL MEDICINE

## 2023-11-21 PROCEDURE — 3074F SYST BP LT 130 MM HG: CPT | Performed by: INTERNAL MEDICINE

## 2023-11-21 PROCEDURE — 3078F DIAST BP <80 MM HG: CPT | Performed by: INTERNAL MEDICINE

## 2023-11-21 PROCEDURE — 90471 IMMUNIZATION ADMIN: CPT | Performed by: INTERNAL MEDICINE

## 2023-11-21 PROCEDURE — 1036F TOBACCO NON-USER: CPT | Performed by: INTERNAL MEDICINE

## 2023-11-21 PROCEDURE — 90686 IIV4 VACC NO PRSV 0.5 ML IM: CPT | Performed by: INTERNAL MEDICINE

## 2023-11-21 RX ORDER — CEFUROXIME AXETIL 250 MG/1
250 TABLET ORAL 2 TIMES DAILY
Qty: 20 TABLET | Refills: 0 | Status: SHIPPED | OUTPATIENT
Start: 2023-11-21 | End: 2023-12-01

## 2023-11-21 RX ORDER — PREDNISONE 10 MG/1
TABLET ORAL
Qty: 18 TABLET | Refills: 0 | Status: SHIPPED | OUTPATIENT
Start: 2023-11-21 | End: 2024-02-20 | Stop reason: WASHOUT

## 2023-11-21 ASSESSMENT — ENCOUNTER SYMPTOMS: BACK PAIN: 1

## 2023-11-21 NOTE — PROGRESS NOTES
Subjective   Patient ID: Durga Romero is a 63 y.o. male who presents for Back Pain and Sinusitis.    HPI     Review of Systems    Objective   There were no vitals taken for this visit.    Physical Exam    Assessment/Plan

## 2023-11-21 NOTE — PROGRESS NOTES
"Subjective   Patient ID: Durga Romero is a 63 y.o. male who presents for Back Pain and Sinusitis.    His lower back pain has gotten worse progressively over the last 2 weeks  Has been doing lower back exercise and it pops in his back  No pain, numbness or tingling in to his legs  No injury, no new activities.   Hurts to drive  He does his lower back exercise 2-3 times a week typically  Taking advil but not helping.     He never did PT. Because it got better    For a few weeks now has had sinus symptoms and coughing and green phlegm and green nasal drainage  No facial pressure, no ear pain or pressure.     Bowels are regular. No constipation.   No blood in urine, no burning or change in stream.     Back Pain    Sinusitis         Review of Systems   Musculoskeletal:  Positive for back pain.       Objective   /79   Pulse 73   Ht 1.778 m (5' 10\")   Wt 97.5 kg (215 lb)   BMI 30.85 kg/m²     Physical Exam  Constitutional:       Appearance: Normal appearance.   HENT:      Right Ear: Tympanic membrane normal.      Left Ear: Tympanic membrane normal.      Nose:      Comments: Slight yellow drainage on the left  No erythema, not edematous     Mouth/Throat:      Pharynx: No oropharyngeal exudate or posterior oropharyngeal erythema.   Cardiovascular:      Rate and Rhythm: Normal rate and regular rhythm.      Heart sounds: No murmur heard.  Pulmonary:      Effort: Pulmonary effort is normal.      Breath sounds: Normal breath sounds.   Abdominal:      Palpations: Abdomen is soft.      Tenderness: There is no abdominal tenderness.   Musculoskeletal:      Right lower leg: No edema.      Left lower leg: No edema.      Comments: Firm tender lower left lumbar ropy paraspinal with muscle knot/ball at L5/s1, markedly tender  No foot drop  Negative SLR, but has pain in left low back with SLR on the left and very tight hamstrings   Neurological:      Mental Status: He is alert.         Assessment/Plan          "

## 2023-11-21 NOTE — PATIENT INSTRUCTIONS
Flu shot today    Lumbar strain with underlying back arthritis and no evidence of sciatica  Take prednisone wean  Call [phone] to schedule physical therapy    Sinus symptoms, no evidence of infection at this time in sinuses or lungs  Take prednisone wean, will help decongest  If facial pain, pressure, ear pain or fever, start cefuroxime 250mg twice daily for 10 days (but not yet as if adequate drainage will clear on it's own)

## 2023-12-07 DIAGNOSIS — E78.5 HYPERLIPIDEMIA, UNSPECIFIED HYPERLIPIDEMIA TYPE: ICD-10-CM

## 2023-12-07 DIAGNOSIS — F41.9 ANXIETY: ICD-10-CM

## 2023-12-07 DIAGNOSIS — K21.9 GASTROESOPHAGEAL REFLUX DISEASE, UNSPECIFIED WHETHER ESOPHAGITIS PRESENT: ICD-10-CM

## 2023-12-07 DIAGNOSIS — S39.012A STRAIN OF LUMBAR REGION, INITIAL ENCOUNTER: ICD-10-CM

## 2023-12-07 DIAGNOSIS — I10 BENIGN ESSENTIAL HYPERTENSION: ICD-10-CM

## 2023-12-07 DIAGNOSIS — F32.1 CURRENT MODERATE EPISODE OF MAJOR DEPRESSIVE DISORDER WITHOUT PRIOR EPISODE (MULTI): ICD-10-CM

## 2023-12-07 RX ORDER — CARVEDILOL 12.5 MG/1
TABLET ORAL
Qty: 180 TABLET | Refills: 1 | Status: SHIPPED | OUTPATIENT
Start: 2023-12-07 | End: 2024-02-20 | Stop reason: SDUPTHER

## 2024-02-20 ENCOUNTER — OFFICE VISIT (OUTPATIENT)
Dept: PRIMARY CARE | Facility: CLINIC | Age: 64
End: 2024-02-20
Payer: COMMERCIAL

## 2024-02-20 ENCOUNTER — OFFICE VISIT (OUTPATIENT)
Dept: PODIATRY | Facility: CLINIC | Age: 64
End: 2024-02-20
Payer: COMMERCIAL

## 2024-02-20 VITALS
DIASTOLIC BLOOD PRESSURE: 83 MMHG | SYSTOLIC BLOOD PRESSURE: 118 MMHG | HEIGHT: 70 IN | BODY MASS INDEX: 32.5 KG/M2 | OXYGEN SATURATION: 98 % | HEART RATE: 72 BPM | WEIGHT: 227 LBS

## 2024-02-20 DIAGNOSIS — F41.9 ANXIETY: ICD-10-CM

## 2024-02-20 DIAGNOSIS — L60.0 INGROWN TOENAIL: Primary | ICD-10-CM

## 2024-02-20 DIAGNOSIS — S39.012A STRAIN OF LUMBAR REGION, INITIAL ENCOUNTER: ICD-10-CM

## 2024-02-20 DIAGNOSIS — L03.032 PARONYCHIA OF GREAT TOE OF LEFT FOOT: Primary | ICD-10-CM

## 2024-02-20 DIAGNOSIS — G89.29 TOE PAIN, CHRONIC, LEFT: ICD-10-CM

## 2024-02-20 DIAGNOSIS — K21.9 GASTROESOPHAGEAL REFLUX DISEASE, UNSPECIFIED WHETHER ESOPHAGITIS PRESENT: ICD-10-CM

## 2024-02-20 DIAGNOSIS — F32.1 CURRENT MODERATE EPISODE OF MAJOR DEPRESSIVE DISORDER WITHOUT PRIOR EPISODE (MULTI): ICD-10-CM

## 2024-02-20 DIAGNOSIS — E78.5 HYPERLIPIDEMIA, UNSPECIFIED HYPERLIPIDEMIA TYPE: ICD-10-CM

## 2024-02-20 DIAGNOSIS — F32.0 DEPRESSION, MAJOR, SINGLE EPISODE, MILD (CMS-HCC): ICD-10-CM

## 2024-02-20 DIAGNOSIS — L03.032 PARONYCHIA OF TOENAIL, LEFT: ICD-10-CM

## 2024-02-20 DIAGNOSIS — I10 BENIGN ESSENTIAL HYPERTENSION: ICD-10-CM

## 2024-02-20 DIAGNOSIS — M79.675 TOE PAIN, CHRONIC, LEFT: ICD-10-CM

## 2024-02-20 PROCEDURE — 1036F TOBACCO NON-USER: CPT | Performed by: INTERNAL MEDICINE

## 2024-02-20 PROCEDURE — 3074F SYST BP LT 130 MM HG: CPT | Performed by: INTERNAL MEDICINE

## 2024-02-20 PROCEDURE — 11730 AVULSION NAIL PLATE SIMPLE 1: CPT | Performed by: PODIATRIST

## 2024-02-20 PROCEDURE — 3079F DIAST BP 80-89 MM HG: CPT | Performed by: INTERNAL MEDICINE

## 2024-02-20 PROCEDURE — 99203 OFFICE O/P NEW LOW 30 MIN: CPT | Performed by: PODIATRIST

## 2024-02-20 PROCEDURE — 1036F TOBACCO NON-USER: CPT | Performed by: PODIATRIST

## 2024-02-20 PROCEDURE — 99214 OFFICE O/P EST MOD 30 MIN: CPT | Performed by: INTERNAL MEDICINE

## 2024-02-20 RX ORDER — CARVEDILOL 12.5 MG/1
12.5 TABLET ORAL
Qty: 180 TABLET | Refills: 1 | Status: SHIPPED | OUTPATIENT
Start: 2024-02-20

## 2024-02-20 RX ORDER — BUPROPION HYDROCHLORIDE 300 MG/1
300 TABLET ORAL DAILY
Qty: 90 TABLET | Refills: 1 | Status: SHIPPED | OUTPATIENT
Start: 2024-02-20

## 2024-02-20 RX ORDER — ROSUVASTATIN CALCIUM 10 MG/1
10 TABLET, COATED ORAL DAILY
Qty: 90 TABLET | Refills: 1 | Status: SHIPPED | OUTPATIENT
Start: 2024-02-20

## 2024-02-20 RX ORDER — AMOXICILLIN AND CLAVULANATE POTASSIUM 875; 125 MG/1; MG/1
875 TABLET, FILM COATED ORAL 2 TIMES DAILY
Qty: 14 TABLET | Refills: 0 | Status: SHIPPED | OUTPATIENT
Start: 2024-02-20 | End: 2024-02-27

## 2024-02-20 RX ORDER — AMOXICILLIN AND CLAVULANATE POTASSIUM 875; 125 MG/1; MG/1
875 TABLET, FILM COATED ORAL 2 TIMES DAILY
Qty: 14 TABLET | Refills: 0 | Status: SHIPPED | OUTPATIENT
Start: 2024-02-20 | End: 2024-02-20 | Stop reason: ENTERED-IN-ERROR

## 2024-02-20 RX ORDER — VENLAFAXINE HYDROCHLORIDE 37.5 MG/1
37.5 CAPSULE, EXTENDED RELEASE ORAL DAILY
Qty: 30 CAPSULE | Refills: 0 | Status: SHIPPED | OUTPATIENT
Start: 2024-02-20 | End: 2024-04-20

## 2024-02-20 RX ORDER — HYDROCODONE BITARTRATE AND ACETAMINOPHEN 5; 325 MG/1; MG/1
1 TABLET ORAL EVERY 6 HOURS PRN
Qty: 20 TABLET | Refills: 0 | Status: SHIPPED | OUTPATIENT
Start: 2024-02-20 | End: 2024-02-25

## 2024-02-20 RX ORDER — ALLOPURINOL 300 MG/1
300 TABLET ORAL DAILY
Qty: 90 TABLET | Refills: 1 | Status: SHIPPED | OUTPATIENT
Start: 2024-02-20

## 2024-02-20 ASSESSMENT — PATIENT HEALTH QUESTIONNAIRE - PHQ9
2. FEELING DOWN, DEPRESSED OR HOPELESS: NOT AT ALL
1. LITTLE INTEREST OR PLEASURE IN DOING THINGS: NOT AT ALL
SUM OF ALL RESPONSES TO PHQ9 QUESTIONS 1 AND 2: 0

## 2024-02-20 NOTE — PROGRESS NOTES
"Subjective   Patient ID: Durga Romero is a 63 y.o. male who presents for red toe.    Left great toe has been painful, inflammed for 2 weeks, was inflamed and puffed up. Gets shooting pain up his leg  Drained once about 1 week ago  Using vaseline or neosporin and taking tylenol  Having pain, about 8/10  Can hardly walk, at night pain is 10/10  Had fevers initially     Needs all meds to go to [facility] due to new insurance    Left groin/hip pain for 2 months, no injury, not worsening or improving, notes when he leans on the counter, not with walking  Has prior hernia repair there         Review of Systems    Objective   /83   Pulse 72   Ht 1.778 m (5' 10\")   Wt 103 kg (227 lb)   SpO2 98%   BMI 32.57 kg/m²     Physical Exam  Abdominal:      Palpations: There is no mass.      Tenderness: There is abdominal tenderness (mild tenderness in the LLQ, no guard or rigidity or reboiund or mass).      Hernia: No hernia is present.   Musculoskeletal:      Comments: Left great toe with bloody crust along nail border.   Corner/lower near second toe with some granulation tissues and up along the upper nail border  Noting some fluctuance and tenderness extending to the outer portion of the lower nail. No drainage could be expressed,     Left hip, full ext rotation         Assessment/Plan          Patient Instructions   Left great toe paronychia (abscess)  Take augmentin 875mg twice daily for 7 days  Will have you see podiatry , as needs surgically drained, Dr. Rangel's office will call you  Do epsom salt soaks twice daily to enhance drainage  Call if rash or diarrhea on antibiotic  For pain can take vicodin 5/325mg every 6  hours as needed for pain    Left groin pain, exam is normal, no hernia, hip is normal.     Sent messages to Dr. HOWARD and KWAN to see if can facilitate  "

## 2024-02-20 NOTE — PROGRESS NOTES
Patient is a same day add on  Presents to clinic for left foot pain  Has ingrown nail  Has been very bothersome  No trauma  No other pedal complaints    Past Medical History  Past Medical History:   Diagnosis Date    Chronic maxillary sinusitis 12/18/2019    Left maxillary sinusitis    Contact with and (suspected) exposure to other viral communicable diseases 03/21/2020    Exposure to influenza    Dizziness and giddiness 01/14/2015    Lightheadedness    Myalgia, other site 06/26/2019    Buttock pain    Noninfective gastroenteritis and colitis, unspecified 05/23/2016    Chronic diarrhea of unknown origin    Other forms of dyspnea 02/15/2019    Dyspnea on exertion    Other headache syndrome 01/14/2015    Chronic mixed headache syndrome    Other intervertebral disc displacement, lumbar region 12/11/2017    Lumbar herniated disc    Other microscopic hematuria 06/11/2020    Other microscopic hematuria    Pain in right ankle and joints of right foot 05/23/2016    Arthralgia of right foot    Personal history of other diseases of the musculoskeletal system and connective tissue 12/04/2017    History of low back pain    Personal history of other diseases of the nervous system and sense organs 10/07/2019    History of blurred vision    Personal history of other diseases of the nervous system and sense organs 11/21/2016    History of cataract    Personal history of other diseases of the respiratory system 10/07/2015    History of pharyngitis    Personal history of other specified conditions 06/09/2016    History of epigastric pain    Personal history of other specified conditions 02/16/2015    History of urinary frequency    Personal history of other specified conditions 01/26/2015    History of urinary retention    Personal history of other specified conditions 01/14/2015    History of memory loss    Personal history of other specified conditions 01/29/2020    History of vertigo    Radiculopathy, lumbar region 12/11/2017     Lumbar radiculitis    Strain of muscle and tendon of back wall of thorax, initial encounter 07/22/2014    Strain of thoracic spine    Syncope and collapse 10/09/2019    Near syncope       Medications and Allergies have been reviewed.    Review Of Systems:  GENERAL: No weight loss, malaise or fevers.  HEENT: Negative for frequent or significant headaches,   RESPIRATORY: Negative for cough, wheezing or shortness of breath.  CARDIOVASCULAR: Negative for chest pain, leg swelling or palpitations.    Examination of Both Lower Extremities:   Objective:   Vasc: DP and PT pulses are palpable bilateral.  CFT is less than 3 seconds bilateral.  Skin temperature is warm to cool proximal to distal bilateral.      Neuro: Vibratory, light touch and proprioception are intact bilateral.      Derm: Nails 1-5 bilateral are intact.  Left lat border notes edema and erythema. No drainage. Tender with palpation    Ortho: Muscle strength is 5/5 for all pedal groups tested.      1. Ingrown toenail        2. Toe pain, chronic, left        3. Paronychia of toenail, left          Patient exam and eval  Left toe cleansed  Left anes with 3cc of 2% lido plain  Was placed on oral abx course today  Partial avulsion completed  Discussed homegoing in structions  Fu prn  Patient was in agreement to this plan. All questions answered.      Ignacia Rangel DPM  [phone]  Option 2  Fax: [phone]

## 2024-02-20 NOTE — PATIENT INSTRUCTIONS
Left great toe paronychia (abscess)  Take augmentin 875mg twice daily for 7 days  Will have you see podiatry , as needs surgically drained, Dr. Rangel's office will call you  Do epsom salt soaks twice daily to enhance drainage  Call if rash or diarrhea on antibiotic  For pain can take vicodin 5/325mg every 6  hours as needed for pain    Left groin pain, exam is normal, no hernia, hip is normal.

## 2024-07-08 DIAGNOSIS — M54.9 BACK PAIN, UNSPECIFIED BACK LOCATION, UNSPECIFIED BACK PAIN LATERALITY, UNSPECIFIED CHRONICITY: ICD-10-CM

## 2024-07-11 ENCOUNTER — TRANSCRIBE ORDERS (OUTPATIENT)
Dept: ORTHOPEDIC SURGERY | Facility: HOSPITAL | Age: 64
End: 2024-07-11
Payer: COMMERCIAL

## 2024-07-11 DIAGNOSIS — M54.50 LOW BACK PAIN, UNSPECIFIED BACK PAIN LATERALITY, UNSPECIFIED CHRONICITY, UNSPECIFIED WHETHER SCIATICA PRESENT: ICD-10-CM

## 2024-07-12 ENCOUNTER — HOSPITAL ENCOUNTER (OUTPATIENT)
Dept: RADIOLOGY | Facility: CLINIC | Age: 64
Discharge: HOME | End: 2024-07-12
Payer: COMMERCIAL

## 2024-07-12 ENCOUNTER — OFFICE VISIT (OUTPATIENT)
Dept: ORTHOPEDIC SURGERY | Facility: CLINIC | Age: 64
End: 2024-07-12
Payer: COMMERCIAL

## 2024-07-12 VITALS — BODY MASS INDEX: 32.5 KG/M2 | WEIGHT: 227 LBS | HEIGHT: 70 IN

## 2024-07-12 DIAGNOSIS — M51.36 DEGENERATIVE DISC DISEASE, LUMBAR: ICD-10-CM

## 2024-07-12 DIAGNOSIS — M47.816 LUMBAR SPONDYLOSIS: ICD-10-CM

## 2024-07-12 DIAGNOSIS — M48.061 LUMBAR STENOSIS WITHOUT NEUROGENIC CLAUDICATION: Primary | ICD-10-CM

## 2024-07-12 DIAGNOSIS — M54.16 LUMBAR RADICULOPATHY: ICD-10-CM

## 2024-07-12 DIAGNOSIS — M54.50 LOW BACK PAIN, UNSPECIFIED BACK PAIN LATERALITY, UNSPECIFIED CHRONICITY, UNSPECIFIED WHETHER SCIATICA PRESENT: ICD-10-CM

## 2024-07-12 DIAGNOSIS — M51.36 DISCOGENIC LOW BACK PAIN: ICD-10-CM

## 2024-07-12 PROCEDURE — 72110 X-RAY EXAM L-2 SPINE 4/>VWS: CPT

## 2024-07-12 PROCEDURE — 99204 OFFICE O/P NEW MOD 45 MIN: CPT

## 2024-07-12 RX ORDER — PREDNISONE 10 MG/1
TABLET ORAL
Qty: 45 TABLET | Refills: 0 | Status: SHIPPED | OUTPATIENT
Start: 2024-07-12 | End: 2024-07-27

## 2024-07-12 RX ORDER — TRAMADOL HYDROCHLORIDE 50 MG/1
50 TABLET ORAL EVERY 6 HOURS PRN
Qty: 15 TABLET | Refills: 0 | Status: SHIPPED | OUTPATIENT
Start: 2024-07-12 | End: 2024-07-19

## 2024-07-12 RX ORDER — KETOROLAC TROMETHAMINE 10 MG/1
10 TABLET, FILM COATED ORAL EVERY 6 HOURS PRN
Qty: 15 TABLET | Refills: 0 | Status: SHIPPED | OUTPATIENT
Start: 2024-07-12 | End: 2024-07-17

## 2024-07-12 ASSESSMENT — PAIN - FUNCTIONAL ASSESSMENT: PAIN_FUNCTIONAL_ASSESSMENT: 0-10

## 2024-07-12 NOTE — PROGRESS NOTES
HPI:  Durga Romero is a 53-year-old male who presents today with a 1 week random onset history of severe low back pain that occasionally radiates down the posterior lateral left leg.  He states he is unable to walk with the pain.  He describes it as stabbing.  Nothing improves the pain, he has tried heat and over-the-counter medications.  He denies numbness and tingling.  He denies symptoms in the right leg.  No other questions or concerns at time of visit.    ROS:  Reviewed on EMR and patient intake sheet.    PMH/SH:  Reviewed on EMR and patient intake sheet.    Exam:  MSK: Full strength range of motion of lower extremities bilaterally.  Negative straight leg raise bilaterally.  General: No acute distress. Awake and conversant.  Eyes: Normal conjunctiva, anicteric. Round symmetric pupils.  ENT: Hearing grossly intact. No nasal discharge.  Neck: Neck is supple. No masses or thyromegaly.  Respiratory: Respirations are non-labored. No wheezing.  Skin: Warm. No rashes or ulcers.  Psych: Alert and oriented. Cooperative, appropriate mood and affect, normal judgement.  CV: No lower extremity edema.  Neuro: Sensation and CN II-XII grossly normal.    Radiology:     Lumbar x-rays personally reviewed and demonstrates flatback deformity of lumbar spine.  Multilevel degenerative disks.  No acute fractures or dislocations.    Diagnosis:    Discogenic low back pain  Lumbar stenosis  Lumbar radiculopathy  Lumbar spondylosis  Lumbar degenerative disc disease    Assessment and Plan:  Patient was seen today and evaluated for lumbar pain for the past week that is severe.  He is also experiencing some radicular symptoms.  At this time, I recommended physical therapy with core strengthening as well as pain management for injections.  He is going on vacation next week, so I prescribed him a course of prednisone, Toradol, and tramadol for as needed use while he schedules with therapy and pain management.  I recommend he follow-up  after completion of physical therapy.  At that point, we will order an MRI.  Patient feels her questions were answered today.  Patient agrees to the plan above.    I have personally reviewed the OARRS report for the patient, no issues identified. This report is scanned into the electronic medical record. I have considered the risks of abuse, dependence, addiction and diversion. The 7 day Rx sent to pharmacy on file. CAM Garcia PA-C  Department of Orthopaedic Surgery  9:36 AM  07/12/24    [address] [place] [address]    Voicemail: [phone]   Appts: [phone]  Fax: [phone]

## 2024-07-15 ENCOUNTER — APPOINTMENT (OUTPATIENT)
Dept: PRIMARY CARE | Facility: CLINIC | Age: 64
End: 2024-07-15
Payer: COMMERCIAL

## 2024-07-17 ENCOUNTER — OFFICE VISIT (OUTPATIENT)
Dept: PAIN MEDICINE | Facility: HOSPITAL | Age: 64
End: 2024-07-17
Payer: COMMERCIAL

## 2024-07-17 DIAGNOSIS — M54.16 LUMBAR RADICULOPATHY: Primary | ICD-10-CM

## 2024-07-17 DIAGNOSIS — M47.817 LUMBOSACRAL SPONDYLOSIS WITHOUT MYELOPATHY: ICD-10-CM

## 2024-07-17 PROCEDURE — 99204 OFFICE O/P NEW MOD 45 MIN: CPT | Performed by: PAIN MEDICINE

## 2024-07-17 PROCEDURE — 99214 OFFICE O/P EST MOD 30 MIN: CPT | Performed by: PAIN MEDICINE

## 2024-07-17 RX ORDER — HYDROCODONE BITARTRATE AND ACETAMINOPHEN 5; 325 MG/1; MG/1
1 TABLET ORAL 2 TIMES DAILY PRN
Qty: 10 TABLET | Refills: 0 | Status: SHIPPED | OUTPATIENT
Start: 2024-07-17 | End: 2024-07-22

## 2024-07-17 ASSESSMENT — PAIN SCALES - GENERAL: PAINLEVEL_OUTOF10: 7

## 2024-07-17 ASSESSMENT — PAIN - FUNCTIONAL ASSESSMENT: PAIN_FUNCTIONAL_ASSESSMENT: 0-10

## 2024-07-17 NOTE — PROGRESS NOTES
Subjective   Patient ID: Durga Romero is a 63 y.o. male with a past medical history of acute onset of left buttock, hip and lower extremity pain that started 10 days ago.     HPI:   This is a 63-year-old male who presents with severe lower back pain that occasionally radiates down the posterior lateral left leg. Patient describes the pain of stabbing, and when the pain is really bad, he is unable to walk. The patient reports on nothing improves the pain. He has tried he and over-the-counter medication‘s. The patient drives any numbness or tingling, and he denies any symptoms in the right leg. He was seen by an orthopedic surgery, physicians assistant who recommended physical therapy with course strengthening and prescribed a course of prednisone, Toradol, and tramadol as needed as he was going on vacation. The patient was also referred to us for further management and consideration of injections.    Patient reports of the pain out of nowhere is located, mostly in his left buttock and radiates to the left post or lateral leg and calf. Being does not go down to the ankle. The patient reports that he has some weakness in his left lower extremity however this may be due to pain. The patient denies any numbness or tingling down his left leg. The patient reports that the pain is so severe that he has been having difficulty walking. He has been sleeping on recliner chair setting up and that seems to be helping his pain. Is worse when he is standing and walking is better when he’s sitting.  Has not noticed that the pain is better when he flexes his spine. Patient denies any bowel or bladder dysfunction or saddle anesthesia. Patient eyes any history of back pain in the past or any back surgeries. The patient does report that he had a writing hernia repair many years ago. He reports that he is taking Tylenol and is finishing the prednisone taper that was prescribed to him. The patient did take tramadol to all that seem to  help with his pain. The patient reports he has had significant difficulty sleeping at night as a pain has been keeping up up all night. He has not started physical therapy yet however is planning to go after he comes back from his trip.    He reports he had an adverse reaction to voltaren gel, it caused significant burning.    The patient is going to [place] today and is driving with his wife. The patient reports that his wife will be driving and they will be spending the night in the middle somewhere where his family lives.    Physical Therapy: The patient has not done physical therapy within the past six months  Other Conservative Measures he has tried: Heating Pad and Ice  Classes of medications tried in the past: Acetaminophen and NSAIDs        Review of Systems   13-point ROS done and negative except for HPI.     Current Outpatient Medications   Medication Instructions    allopurinol (ZYLOPRIM) 300 mg, oral, Daily    buPROPion XL (WELLBUTRIN XL) 300 mg, oral, Daily    carvedilol (COREG) 12.5 mg, oral, 2 times daily (morning and late afternoon)    cholecalciferol, vitamin D3, 10 mcg (400 unit) capsule oral    fish oil concentrate (Omega-3) 120-180 mg capsule oral    fluticasone (Flonase) 50 mcg/actuation nasal spray 2 sprays, nasal, Daily    gabapentin (Neurontin) 300 mg capsule oral    ketorolac (TORADOL) 10 mg, oral, Every 6 hours PRN    predniSONE (Deltasone) 10 mg tablet Take 4 tablets (40 mg) by mouth once daily for 5 days, THEN 3 tablets (30 mg) once daily for 5 days, THEN 2 tablets (20 mg) once daily for 5 days.    rosuvastatin (CRESTOR) 10 mg, oral, Daily    sildenafil (Viagra) 100 mg tablet oral    traMADol (ULTRAM) 50 mg, oral, Every 6 hours PRN    venlafaxine XR (EFFEXOR-XR) 37.5 mg, oral, Daily, Do not crush or chew.       Past Medical History:   Diagnosis Date    Chronic maxillary sinusitis 12/18/2019    Left maxillary sinusitis    Contact with and (suspected) exposure to other viral  communicable diseases 03/21/2020    Exposure to influenza    Dizziness and giddiness 01/14/2015    Lightheadedness    Myalgia, other site 06/26/2019    Buttock pain    Noninfective gastroenteritis and colitis, unspecified 05/23/2016    Chronic diarrhea of unknown origin    Other forms of dyspnea 02/15/2019    Dyspnea on exertion    Other headache syndrome 01/14/2015    Chronic mixed headache syndrome    Other intervertebral disc displacement, lumbar region 12/11/2017    Lumbar herniated disc    Other microscopic hematuria 06/11/2020    Other microscopic hematuria    Pain in right ankle and joints of right foot 05/23/2016    Arthralgia of right foot    Personal history of other diseases of the musculoskeletal system and connective tissue 12/04/2017    History of low back pain    Personal history of other diseases of the nervous system and sense organs 10/07/2019    History of blurred vision    Personal history of other diseases of the nervous system and sense organs 11/21/2016    History of cataract    Personal history of other diseases of the respiratory system 10/07/2015    History of pharyngitis    Personal history of other specified conditions 06/09/2016    History of epigastric pain    Personal history of other specified conditions 02/16/2015    History of urinary frequency    Personal history of other specified conditions 01/26/2015    History of urinary retention    Personal history of other specified conditions 01/14/2015    History of memory loss    Personal history of other specified conditions 01/29/2020    History of vertigo    Radiculopathy, lumbar region 12/11/2017    Lumbar radiculitis    Strain of muscle and tendon of back wall of thorax, initial encounter 07/22/2014    Strain of thoracic spine    Syncope and collapse 10/09/2019    Near syncope        Past Surgical History:   Procedure Laterality Date    HERNIA REPAIR  06/21/2013    Hernia Repair Inguinal Sliding        No family history on file.      No Known Allergies     Objective     There were no vitals filed for this visit.     Physical Exam  General: NAD, well groomed, well nourished  Eyes: Non-icteric sclera, EOMI  Ears, Nose, Mouth, and Throat: External ears and nose appear to be without deformity or rash. No lesions or masses noted. Hearing is grossly intact.   Neck: Trachea midline  Respiratory: Nonlabored breathing   Cardiovascular: no peripheral edema   Skin: No rashes or open lesions/ulcers identified on skin.    Back:   Palpation: no TTP of his lumbar paraspinal muscles  Straight leg raise: positive at 30 degrees on the left  SANGITA Maneuver does reproduce pain on the left    Hip: Pain over left greater trochanter., Pain reproduced with internal/external rotation., and on the left  +fabers on the left    Neurologic:   Cranial nerves grossly intact.   Strength 3/5 on the LLE and 5/5 on the RLE  Sensation: Normal to light touch throughout  DTRs:normal and symmetric throughout      Psychiatric: Alert, orientation to person, place, and time. Cooperative.    Imaging personally reviewed:   Xray Lumbar Spine (7/12/24):  FINDINGS:  There is straightening of normal lumbar lordosis.  Severe spondylosis and facet joint arthropathy at L5-S1 with disc  height loss and facet sclerosis. Moderate degenerative changes at  L2-3 and L4-5. Mild degenerative changes of the rest of the levels.      MRI Lumbar Spine 3/16/16:  Signs/Symptoms: severe lower back pain with radiation down the left   leg and weakness in the left leg no comp. OLIVIER MILTNO, DO   [phone]. BLZ; Severe lower back and Lt.leg pain for 1 month, no   lower back injury, no lower back surgery.      The lumbar spine was studied in the sagital, axial and coronal planes   utiliing T1 and T2 weighted images.  Previous studies are not   available for comparison  The marrow signal and vertebral body height are normal. The conus and   sacrum are normal.  Images at each interspace reveal the  following:  L1/L2  Mild facet hypertrophy without canal or foraminal narrowing  L2/L3  Circumferential bulging disc and mild facet hypertrophy without canal   or foraminal narrowing  L3/L4  Asymmetrical bulging of the intervertebral disc to the left.    Bilateral facet hypertrophy.  Focal left-sided foraminal narrowing.    Suspected focal disc herniation within the left-sided neural foramen   versus swelling of the nerve root on axial T2-weighted image 15/28.    No measurable central canal stenosis  L4/L5  Circumferential bulging disc and bilateral facet hypertrophy.    Flattening of the thecal sac without measurable central canal   stenosis.  Bilateral narrowing of the lateral recesses and neural   foramina.  L5/S1  Asymmetrical bulging of the intervertebral disc to the left.    Bilateral facet hypertrophy.  No measurable canal stenosis.    Deformity of the neural foramina bilaterally without evidence of   nerve root compression.  IMPRESSION:  Lumbar spondylosis as described above.     Suspected herniated nucleus pulposis or enlarged nerve root L4 on the   left    Assessment/Plan   This is a 63-year-old male with acute onset of left buttock, hip, leg pain that started about 10 days ago. The patient reports that the pain is sharp in nature and 9.5 out of 10. Pain radiates to his left lateral calf all the way down to his ankle on the left. The patient denies any numbness or tingling, bowel or bladder dysfunction or saddle anesthesia. The patient had an x-ray of his lumbar spine demonstrated severe changes L5 - S1 and moderate degenerative changes at L2 - L3, L4 - L5. On exam, the patient is sitting in a chair, leaning to the right side, his gait is antalgic and favors his right side significantly, he has a positive straight leg raise test on the left, positive fabers on the left, pain with internal external rotation of his left hip, positive galens test on the left. He also has tenderness to palpation of his left  trochanteric bursa. It appears the pain is consistent with lumbar radiculopathy/neurogenic claudication. He has not started physical therapy yet as he is going on vacation today. The patient is in the middle of his prednisone taper, and it has helped the pain a little bit. Patient will require lumbar spine MRI as he has left lower extremity weakness and symptoms of lumbar radiculopathy/claudication. We will prescribe him a short, 5 day course of norco for his pain as he is going on vacation for a couple of days.    Plan:  -prescribe short, 5 day, course of Norco 5mg BID to help with pain as he is going on vacation for a couple days today'   - Chronic opioid use for non-cancer pain has the deleterious effects of respiratory suppression, endocrine dysfunction, osteoporosis, immunosuppression including increased cancer risk, sexual dysfunction, dependence, addiction, death, and paradoxically worsening pain. We discussed that he should only use these medications when his pain  is severe, and that if his pain is not severe he should take tylenol. The patient was also counseled that someone should be present with him, such as his wife, when he is taking his norco and that he should take them at nighttime at first as they can cause sedation  - finish prednisone taper  - MRI lumbar spine as patient is having LLE weakness with concern for lumbar radiculopathy/neurogenic claudication  - patient to start PT when he returns from his vacation  - patient counseled on concerning signs/symptoms such as worsening weakness, bowel/bladder dysfunction, or any acute severe worsening of pain. If he were to experience these he was instructed to go to the nearest ER    Follow up: After MRI    The patient was invited to contact us back anytime with any questions or concerns and follow-up with us in the office as needed.     Diagnoses and all orders for this visit:  Lumbar radiculopathy  -     Referral to Pain Management  -     MR lumbar  spine wo IV contrast; Future  -     Referral to Physical Therapy; Future  Lumbosacral spondylosis without myelopathy  -     MR lumbar spine wo IV contrast; Future  -     Referral to Physical Therapy; Future      This note was generated with the aid of dictation software, there may be typos despite my attempts at proofreading.   The patient was discussed and seen with Dr. Campbell.    Chintan Pacheco MD  PGY-5  Interventional Pain Fellow

## 2024-07-23 ENCOUNTER — APPOINTMENT (OUTPATIENT)
Dept: PAIN MEDICINE | Facility: HOSPITAL | Age: 64
End: 2024-07-23
Payer: COMMERCIAL

## 2024-07-25 DIAGNOSIS — M54.16 LUMBAR RADICULOPATHY: ICD-10-CM

## 2024-07-26 RX ORDER — HYDROCODONE BITARTRATE AND ACETAMINOPHEN 5; 325 MG/1; MG/1
1 TABLET ORAL 3 TIMES DAILY PRN
Qty: 21 TABLET | Refills: 0 | Status: SHIPPED | OUTPATIENT
Start: 2024-07-26 | End: 2024-08-02

## 2024-08-05 DIAGNOSIS — M54.16 LUMBAR RADICULOPATHY: ICD-10-CM

## 2024-08-05 RX ORDER — HYDROCODONE BITARTRATE AND ACETAMINOPHEN 5; 325 MG/1; MG/1
1 TABLET ORAL 3 TIMES DAILY PRN
Qty: 21 TABLET | Refills: 0 | Status: SHIPPED | OUTPATIENT
Start: 2024-08-05 | End: 2024-08-12

## 2024-08-08 ENCOUNTER — APPOINTMENT (OUTPATIENT)
Dept: RADIOLOGY | Facility: HOSPITAL | Age: 64
End: 2024-08-08
Payer: COMMERCIAL

## 2024-08-16 DIAGNOSIS — E78.5 HYPERLIPIDEMIA, UNSPECIFIED HYPERLIPIDEMIA TYPE: ICD-10-CM

## 2024-08-16 DIAGNOSIS — M1A.9XX0 CHRONIC GOUT WITHOUT TOPHUS, UNSPECIFIED CAUSE, UNSPECIFIED SITE: ICD-10-CM

## 2024-08-16 DIAGNOSIS — R73.01 IMPAIRED FASTING GLUCOSE: ICD-10-CM

## 2024-08-22 DIAGNOSIS — K21.9 GASTROESOPHAGEAL REFLUX DISEASE, UNSPECIFIED WHETHER ESOPHAGITIS PRESENT: ICD-10-CM

## 2024-08-22 DIAGNOSIS — F41.9 ANXIETY: ICD-10-CM

## 2024-08-22 DIAGNOSIS — E78.5 HYPERLIPIDEMIA, UNSPECIFIED HYPERLIPIDEMIA TYPE: ICD-10-CM

## 2024-08-22 DIAGNOSIS — F32.1 CURRENT MODERATE EPISODE OF MAJOR DEPRESSIVE DISORDER WITHOUT PRIOR EPISODE (MULTI): ICD-10-CM

## 2024-08-22 DIAGNOSIS — I10 BENIGN ESSENTIAL HYPERTENSION: ICD-10-CM

## 2024-08-22 DIAGNOSIS — S39.012A STRAIN OF LUMBAR REGION, INITIAL ENCOUNTER: ICD-10-CM

## 2024-08-23 RX ORDER — ROSUVASTATIN CALCIUM 10 MG/1
10 TABLET, COATED ORAL DAILY
Qty: 90 TABLET | Refills: 1 | Status: SHIPPED | OUTPATIENT
Start: 2024-08-23

## 2024-08-23 RX ORDER — ALLOPURINOL 300 MG/1
300 TABLET ORAL DAILY
Qty: 90 TABLET | Refills: 1 | Status: SHIPPED | OUTPATIENT
Start: 2024-08-23

## 2024-08-23 RX ORDER — CARVEDILOL 12.5 MG/1
12.5 TABLET ORAL
Qty: 180 TABLET | Refills: 1 | Status: SHIPPED | OUTPATIENT
Start: 2024-08-23

## 2024-08-23 RX ORDER — BUPROPION HYDROCHLORIDE 300 MG/1
300 TABLET ORAL DAILY
Qty: 90 TABLET | Refills: 1 | Status: SHIPPED | OUTPATIENT
Start: 2024-08-23

## 2024-08-30 ENCOUNTER — OFFICE VISIT (OUTPATIENT)
Dept: PRIMARY CARE | Facility: CLINIC | Age: 64
End: 2024-08-30
Payer: COMMERCIAL

## 2024-08-30 ENCOUNTER — LAB (OUTPATIENT)
Dept: LAB | Facility: LAB | Age: 64
End: 2024-08-30
Payer: COMMERCIAL

## 2024-08-30 VITALS
DIASTOLIC BLOOD PRESSURE: 102 MMHG | WEIGHT: 225 LBS | HEART RATE: 77 BPM | OXYGEN SATURATION: 98 % | HEIGHT: 70 IN | SYSTOLIC BLOOD PRESSURE: 166 MMHG | BODY MASS INDEX: 32.21 KG/M2

## 2024-08-30 DIAGNOSIS — N40.1 BENIGN PROSTATIC HYPERPLASIA WITH URINARY HESITANCY: ICD-10-CM

## 2024-08-30 DIAGNOSIS — M54.16 LUMBAR RADICULOPATHY: Primary | ICD-10-CM

## 2024-08-30 DIAGNOSIS — I10 BENIGN ESSENTIAL HYPERTENSION: ICD-10-CM

## 2024-08-30 DIAGNOSIS — R35.0 FREQUENT URINATION: ICD-10-CM

## 2024-08-30 DIAGNOSIS — R73.01 IMPAIRED FASTING GLUCOSE: ICD-10-CM

## 2024-08-30 DIAGNOSIS — N41.0 ACUTE PROSTATITIS: ICD-10-CM

## 2024-08-30 DIAGNOSIS — E78.5 HYPERLIPIDEMIA, UNSPECIFIED HYPERLIPIDEMIA TYPE: ICD-10-CM

## 2024-08-30 DIAGNOSIS — M1A.9XX0 CHRONIC GOUT WITHOUT TOPHUS, UNSPECIFIED CAUSE, UNSPECIFIED SITE: ICD-10-CM

## 2024-08-30 DIAGNOSIS — R39.11 BENIGN PROSTATIC HYPERPLASIA WITH URINARY HESITANCY: ICD-10-CM

## 2024-08-30 LAB
ALBUMIN SERPL BCP-MCNC: 4 G/DL (ref 3.4–5)
ALP SERPL-CCNC: 45 U/L (ref 33–136)
ALT SERPL W P-5'-P-CCNC: 43 U/L (ref 10–52)
ANION GAP SERPL CALC-SCNC: 15 MMOL/L (ref 10–20)
AST SERPL W P-5'-P-CCNC: 33 U/L (ref 9–39)
BASOPHILS # BLD AUTO: 0.05 X10*3/UL (ref 0–0.1)
BASOPHILS NFR BLD AUTO: 0.7 %
BILIRUB SERPL-MCNC: 0.6 MG/DL (ref 0–1.2)
BUN SERPL-MCNC: 14 MG/DL (ref 6–23)
CALCIUM SERPL-MCNC: 8.8 MG/DL (ref 8.6–10.3)
CHLORIDE SERPL-SCNC: 105 MMOL/L (ref 98–107)
CHOLEST SERPL-MCNC: 113 MG/DL (ref 0–199)
CHOLESTEROL/HDL RATIO: 2.7
CK SERPL-CCNC: 149 U/L (ref 0–325)
CO2 SERPL-SCNC: 23 MMOL/L (ref 21–32)
CREAT SERPL-MCNC: 1.07 MG/DL (ref 0.5–1.3)
EGFRCR SERPLBLD CKD-EPI 2021: 78 ML/MIN/1.73M*2
EOSINOPHIL # BLD AUTO: 0.52 X10*3/UL (ref 0–0.7)
EOSINOPHIL NFR BLD AUTO: 6.9 %
ERYTHROCYTE [DISTWIDTH] IN BLOOD BY AUTOMATED COUNT: 14 % (ref 11.5–14.5)
EST. AVERAGE GLUCOSE BLD GHB EST-MCNC: 126 MG/DL
GLUCOSE SERPL-MCNC: 135 MG/DL (ref 74–99)
HBA1C MFR BLD: 6 %
HCT VFR BLD AUTO: 42.9 % (ref 41–52)
HDLC SERPL-MCNC: 41.5 MG/DL
HGB BLD-MCNC: 14.2 G/DL (ref 13.5–17.5)
IMM GRANULOCYTES # BLD AUTO: 0.03 X10*3/UL (ref 0–0.7)
IMM GRANULOCYTES NFR BLD AUTO: 0.4 % (ref 0–0.9)
LDLC SERPL CALC-MCNC: 43 MG/DL
LYMPHOCYTES # BLD AUTO: 1.26 X10*3/UL (ref 1.2–4.8)
LYMPHOCYTES NFR BLD AUTO: 16.8 %
MCH RBC QN AUTO: 30.8 PG (ref 26–34)
MCHC RBC AUTO-ENTMCNC: 33.1 G/DL (ref 32–36)
MCV RBC AUTO: 93 FL (ref 80–100)
MONOCYTES # BLD AUTO: 0.82 X10*3/UL (ref 0.1–1)
MONOCYTES NFR BLD AUTO: 10.9 %
NEUTROPHILS # BLD AUTO: 4.84 X10*3/UL (ref 1.2–7.7)
NEUTROPHILS NFR BLD AUTO: 64.3 %
NON HDL CHOLESTEROL: 72 MG/DL (ref 0–149)
NRBC BLD-RTO: 0 /100 WBCS (ref 0–0)
PLATELET # BLD AUTO: 156 X10*3/UL (ref 150–450)
POC APPEARANCE, URINE: CLEAR
POC BILIRUBIN, URINE: NEGATIVE
POC BLOOD, URINE: NEGATIVE
POC COLOR, URINE: YELLOW
POC GLUCOSE, URINE: NEGATIVE MG/DL
POC KETONES, URINE: NEGATIVE MG/DL
POC LEUKOCYTES, URINE: NEGATIVE
POC NITRITE,URINE: NEGATIVE
POC PH, URINE: 6 PH
POC PROTEIN, URINE: NEGATIVE MG/DL
POC SPECIFIC GRAVITY, URINE: 1.01
POC UROBILINOGEN, URINE: 0.2 EU/DL
POTASSIUM SERPL-SCNC: 3.6 MMOL/L (ref 3.5–5.3)
PROT SERPL-MCNC: 6.1 G/DL (ref 6.4–8.2)
PSA SERPL-MCNC: 0.95 NG/ML
RBC # BLD AUTO: 4.61 X10*6/UL (ref 4.5–5.9)
SODIUM SERPL-SCNC: 139 MMOL/L (ref 136–145)
TRIGL SERPL-MCNC: 145 MG/DL (ref 0–149)
TSH SERPL-ACNC: 3.15 MIU/L (ref 0.44–3.98)
URATE SERPL-MCNC: 3.5 MG/DL (ref 4–7.5)
VLDL: 29 MG/DL (ref 0–40)
WBC # BLD AUTO: 7.5 X10*3/UL (ref 4.4–11.3)

## 2024-08-30 PROCEDURE — 80053 COMPREHEN METABOLIC PANEL: CPT

## 2024-08-30 PROCEDURE — 1036F TOBACCO NON-USER: CPT | Performed by: INTERNAL MEDICINE

## 2024-08-30 PROCEDURE — 84550 ASSAY OF BLOOD/URIC ACID: CPT

## 2024-08-30 PROCEDURE — 36415 COLL VENOUS BLD VENIPUNCTURE: CPT

## 2024-08-30 PROCEDURE — 3077F SYST BP >= 140 MM HG: CPT | Performed by: INTERNAL MEDICINE

## 2024-08-30 PROCEDURE — 84153 ASSAY OF PSA TOTAL: CPT

## 2024-08-30 PROCEDURE — 3008F BODY MASS INDEX DOCD: CPT | Performed by: INTERNAL MEDICINE

## 2024-08-30 PROCEDURE — 83036 HEMOGLOBIN GLYCOSYLATED A1C: CPT

## 2024-08-30 PROCEDURE — 3080F DIAST BP >= 90 MM HG: CPT | Performed by: INTERNAL MEDICINE

## 2024-08-30 PROCEDURE — 80061 LIPID PANEL: CPT

## 2024-08-30 PROCEDURE — 81003 URINALYSIS AUTO W/O SCOPE: CPT | Performed by: INTERNAL MEDICINE

## 2024-08-30 PROCEDURE — 99214 OFFICE O/P EST MOD 30 MIN: CPT | Performed by: INTERNAL MEDICINE

## 2024-08-30 PROCEDURE — 85025 COMPLETE CBC W/AUTO DIFF WBC: CPT

## 2024-08-30 PROCEDURE — 82550 ASSAY OF CK (CPK): CPT

## 2024-08-30 PROCEDURE — 84443 ASSAY THYROID STIM HORMONE: CPT

## 2024-08-30 RX ORDER — TAMSULOSIN HYDROCHLORIDE 0.4 MG/1
0.4 CAPSULE ORAL DAILY
Qty: 30 CAPSULE | Refills: 1 | Status: SHIPPED | OUTPATIENT
Start: 2024-08-30 | End: 2025-08-30

## 2024-08-30 RX ORDER — CEPHALEXIN 500 MG/1
500 CAPSULE ORAL 2 TIMES DAILY
Qty: 28 CAPSULE | Refills: 0 | Status: SHIPPED | OUTPATIENT
Start: 2024-08-30 | End: 2024-09-13

## 2024-08-30 RX ORDER — PREDNISONE 10 MG/1
TABLET ORAL
Qty: 18 TABLET | Refills: 0 | Status: SHIPPED | OUTPATIENT
Start: 2024-08-30

## 2024-08-30 RX ORDER — OLMESARTAN MEDOXOMIL 20 MG/1
20 TABLET ORAL DAILY
Qty: 30 TABLET | Refills: 5 | Status: SHIPPED | OUTPATIENT
Start: 2024-08-30 | End: 2025-02-26

## 2024-08-30 RX ORDER — GABAPENTIN 300 MG/1
300 CAPSULE ORAL 3 TIMES DAILY
Qty: 90 CAPSULE | Refills: 1 | Status: SHIPPED | OUTPATIENT
Start: 2024-08-30

## 2024-08-30 ASSESSMENT — PATIENT HEALTH QUESTIONNAIRE - PHQ9
1. LITTLE INTEREST OR PLEASURE IN DOING THINGS: NOT AT ALL
SUM OF ALL RESPONSES TO PHQ9 QUESTIONS 1 AND 2: 0
2. FEELING DOWN, DEPRESSED OR HOPELESS: NOT AT ALL

## 2024-08-30 NOTE — PATIENT INSTRUCTIONS
Lumbar radiculopathy/sciatica  Take prednisone wean as ordered  Start gabapentin 300mg twice daily for 3 days and if pain still over 4/10, increase to 3 times a day  Continue with pain management  Called for MRI report from [facility]    Hypertension, poorly controlled  Add olmesartan 20mg daily  Continue carvedilol twice a day    Prostatitis,   Take cephelexin 500mg twice daily for 2 weeks  Urine was normal

## 2024-08-30 NOTE — PROGRESS NOTES
"Subjective   Patient ID: Durga Romero is a 64 y.o. male who presents for prostate problem (Frequent urination) and Leg Swelling ( left leg swelling x 1 week, back and leg pain x 8 weeks ).    His left leg and hip pain for 8 weeks  Saw a doctor/pain management  Had a shot in his back  It helped initially.   He did not do any PT, but did home exercises and helped a little.   He cannot sleep in the bed, having to sleep in a chair, is a shooting/stabbing pain.   No anti-inflammatories,   Has a follow up appt. With pain management.     He is urinating more frequently    Had MRI of lumbar spine about 2 weeks ago at [facility], in [facility]  The pain is so bad he cannot sleep    Pt is requesting handicap placard due to sciatic symptoms             Review of Systems    Objective   Ht 1.778 m (5' 10\")   Wt 102 kg (225 lb)   BMI 32.28 kg/m²     Physical Exam  Constitutional:       Appearance: Normal appearance.   Neck:      Vascular: No carotid bruit.   Cardiovascular:      Rate and Rhythm: Normal rate and regular rhythm.   Abdominal:      Palpations: Abdomen is soft.      Tenderness: There is no abdominal tenderness.      Comments: Slight weakness with cough left just above inguinal ligament   Genitourinary:     Comments: Prostate is 4+ enlarged and boggy  No nodules  Sphincter tone is lax  Musculoskeletal:      Right lower leg: Edema present.      Left lower leg: Edema (2+ b/l edema) present.      Comments: No foot drop  Motor full  SLR positive on the left  Muscle knots noted in the left lower lumbar area   Lymphadenopathy:      Cervical: No cervical adenopathy.   Neurological:      Mental Status: He is alert and oriented to person, place, and time.   Psychiatric:         Mood and Affect: Mood normal.         Assessment/Plan          Patient Instructions   Lumbar radiculopathy/sciatica  Take prednisone wean as ordered  Start gabapentin 300mg twice daily for 3 days and if pain still over 4/10, increase to 3 times a " day  Continue with pain management  Called for MRI report from [facility]    Hypertension, poorly controlled  Add olmesartan 20mg daily  Continue carvedilol twice a day    Prostatitis,   Take cephelexin 500mg twice daily for 2 weeks  Urine was normal     Handicap placard written for 3 months since limited walking due to radiculopathy

## 2024-09-03 ENCOUNTER — APPOINTMENT (OUTPATIENT)
Dept: RADIOLOGY | Facility: HOSPITAL | Age: 64
End: 2024-09-03
Payer: COMMERCIAL

## 2024-09-03 ENCOUNTER — TELEPHONE (OUTPATIENT)
Dept: PRIMARY CARE | Facility: CLINIC | Age: 64
End: 2024-09-03
Payer: COMMERCIAL

## 2024-09-03 ENCOUNTER — HOSPITAL ENCOUNTER (INPATIENT)
Facility: HOSPITAL | Age: 64
LOS: 4 days | Discharge: HOME | End: 2024-09-07
Attending: EMERGENCY MEDICINE | Admitting: INTERNAL MEDICINE
Payer: COMMERCIAL

## 2024-09-03 ENCOUNTER — DOCUMENTATION (OUTPATIENT)
Dept: SURGERY | Facility: HOSPITAL | Age: 64
End: 2024-09-03

## 2024-09-03 DIAGNOSIS — R22.31 LOCALIZED SWELLING OF RIGHT UPPER EXTREMITY: ICD-10-CM

## 2024-09-03 DIAGNOSIS — K57.20 DIVERTICULITIS OF COLON WITH PERFORATION: ICD-10-CM

## 2024-09-03 DIAGNOSIS — K57.92 ACUTE DIVERTICULITIS: ICD-10-CM

## 2024-09-03 DIAGNOSIS — K57.92 DIVERTICULITIS: Primary | ICD-10-CM

## 2024-09-03 LAB
ALBUMIN SERPL BCP-MCNC: 3.7 G/DL (ref 3.4–5)
ALP SERPL-CCNC: 41 U/L (ref 33–136)
ALT SERPL W P-5'-P-CCNC: 70 U/L (ref 10–52)
ANION GAP SERPL CALC-SCNC: 15 MMOL/L (ref 10–20)
APPEARANCE UR: CLEAR
AST SERPL W P-5'-P-CCNC: 31 U/L (ref 9–39)
BASOPHILS # BLD AUTO: 0.02 X10*3/UL (ref 0–0.1)
BASOPHILS NFR BLD AUTO: 0.2 %
BILIRUB SERPL-MCNC: 1.1 MG/DL (ref 0–1.2)
BILIRUB UR STRIP.AUTO-MCNC: NEGATIVE MG/DL
BUN SERPL-MCNC: 20 MG/DL (ref 6–23)
CALCIUM SERPL-MCNC: 9.6 MG/DL (ref 8.6–10.3)
CHLORIDE SERPL-SCNC: 102 MMOL/L (ref 98–107)
CO2 SERPL-SCNC: 24 MMOL/L (ref 21–32)
COLOR UR: COLORLESS
CREAT SERPL-MCNC: 1.12 MG/DL (ref 0.5–1.3)
EGFRCR SERPLBLD CKD-EPI 2021: 74 ML/MIN/1.73M*2
EOSINOPHIL # BLD AUTO: 0.02 X10*3/UL (ref 0–0.7)
EOSINOPHIL NFR BLD AUTO: 0.2 %
ERYTHROCYTE [DISTWIDTH] IN BLOOD BY AUTOMATED COUNT: 14.5 % (ref 11.5–14.5)
GLUCOSE SERPL-MCNC: 119 MG/DL (ref 74–99)
GLUCOSE UR STRIP.AUTO-MCNC: NORMAL MG/DL
HCT VFR BLD AUTO: 42.2 % (ref 41–52)
HGB BLD-MCNC: 14.3 G/DL (ref 13.5–17.5)
HOLD SPECIMEN: NORMAL
IMM GRANULOCYTES # BLD AUTO: 0.04 X10*3/UL (ref 0–0.7)
IMM GRANULOCYTES NFR BLD AUTO: 0.4 % (ref 0–0.9)
KETONES UR STRIP.AUTO-MCNC: NEGATIVE MG/DL
LACTATE SERPL-SCNC: 1.3 MMOL/L (ref 0.4–2)
LEUKOCYTE ESTERASE UR QL STRIP.AUTO: NEGATIVE
LYMPHOCYTES # BLD AUTO: 1.27 X10*3/UL (ref 1.2–4.8)
LYMPHOCYTES NFR BLD AUTO: 12.3 %
MAGNESIUM SERPL-MCNC: 1.72 MG/DL (ref 1.6–2.4)
MCH RBC QN AUTO: 31.5 PG (ref 26–34)
MCHC RBC AUTO-ENTMCNC: 33.9 G/DL (ref 32–36)
MCV RBC AUTO: 93 FL (ref 80–100)
MONOCYTES # BLD AUTO: 0.99 X10*3/UL (ref 0.1–1)
MONOCYTES NFR BLD AUTO: 9.6 %
NEUTROPHILS # BLD AUTO: 7.97 X10*3/UL (ref 1.2–7.7)
NEUTROPHILS NFR BLD AUTO: 77.3 %
NITRITE UR QL STRIP.AUTO: NEGATIVE
NRBC BLD-RTO: 0 /100 WBCS (ref 0–0)
PH UR STRIP.AUTO: 6 [PH]
PLATELET # BLD AUTO: 156 X10*3/UL (ref 150–450)
POTASSIUM SERPL-SCNC: 3.8 MMOL/L (ref 3.5–5.3)
PROT SERPL-MCNC: 6.5 G/DL (ref 6.4–8.2)
PROT UR STRIP.AUTO-MCNC: NEGATIVE MG/DL
RBC # BLD AUTO: 4.54 X10*6/UL (ref 4.5–5.9)
RBC # UR STRIP.AUTO: NEGATIVE /UL
SODIUM SERPL-SCNC: 137 MMOL/L (ref 136–145)
SP GR UR STRIP.AUTO: 1.02
UROBILINOGEN UR STRIP.AUTO-MCNC: NORMAL MG/DL
WBC # BLD AUTO: 10.3 X10*3/UL (ref 4.4–11.3)

## 2024-09-03 PROCEDURE — 36415 COLL VENOUS BLD VENIPUNCTURE: CPT | Performed by: EMERGENCY MEDICINE

## 2024-09-03 PROCEDURE — 2500000004 HC RX 250 GENERAL PHARMACY W/ HCPCS (ALT 636 FOR OP/ED): Performed by: INTERNAL MEDICINE

## 2024-09-03 PROCEDURE — 2500000002 HC RX 250 W HCPCS SELF ADMINISTERED DRUGS (ALT 637 FOR MEDICARE OP, ALT 636 FOR OP/ED): Performed by: INTERNAL MEDICINE

## 2024-09-03 PROCEDURE — 96361 HYDRATE IV INFUSION ADD-ON: CPT

## 2024-09-03 PROCEDURE — 2500000004 HC RX 250 GENERAL PHARMACY W/ HCPCS (ALT 636 FOR OP/ED): Performed by: EMERGENCY MEDICINE

## 2024-09-03 PROCEDURE — 1210000001 HC SEMI-PRIVATE ROOM DAILY

## 2024-09-03 PROCEDURE — 99285 EMERGENCY DEPT VISIT HI MDM: CPT | Mod: 25

## 2024-09-03 PROCEDURE — 96375 TX/PRO/DX INJ NEW DRUG ADDON: CPT

## 2024-09-03 PROCEDURE — 2500000001 HC RX 250 WO HCPCS SELF ADMINISTERED DRUGS (ALT 637 FOR MEDICARE OP): Performed by: INTERNAL MEDICINE

## 2024-09-03 PROCEDURE — 99222 1ST HOSP IP/OBS MODERATE 55: CPT | Performed by: SURGERY

## 2024-09-03 PROCEDURE — 96374 THER/PROPH/DIAG INJ IV PUSH: CPT

## 2024-09-03 PROCEDURE — 80053 COMPREHEN METABOLIC PANEL: CPT | Performed by: EMERGENCY MEDICINE

## 2024-09-03 PROCEDURE — 85025 COMPLETE CBC W/AUTO DIFF WBC: CPT | Performed by: EMERGENCY MEDICINE

## 2024-09-03 PROCEDURE — 99223 1ST HOSP IP/OBS HIGH 75: CPT | Performed by: INTERNAL MEDICINE

## 2024-09-03 PROCEDURE — 2550000001 HC RX 255 CONTRASTS: Performed by: EMERGENCY MEDICINE

## 2024-09-03 PROCEDURE — 2500000005 HC RX 250 GENERAL PHARMACY W/O HCPCS: Performed by: INTERNAL MEDICINE

## 2024-09-03 PROCEDURE — 81003 URINALYSIS AUTO W/O SCOPE: CPT | Performed by: EMERGENCY MEDICINE

## 2024-09-03 PROCEDURE — 74177 CT ABD & PELVIS W/CONTRAST: CPT | Performed by: RADIOLOGY

## 2024-09-03 PROCEDURE — 83605 ASSAY OF LACTIC ACID: CPT | Performed by: EMERGENCY MEDICINE

## 2024-09-03 PROCEDURE — 83735 ASSAY OF MAGNESIUM: CPT | Performed by: EMERGENCY MEDICINE

## 2024-09-03 PROCEDURE — 74177 CT ABD & PELVIS W/CONTRAST: CPT

## 2024-09-03 RX ORDER — PROCHLORPERAZINE EDISYLATE 5 MG/ML
10 INJECTION INTRAMUSCULAR; INTRAVENOUS EVERY 6 HOURS PRN
Status: DISCONTINUED | OUTPATIENT
Start: 2024-09-03 | End: 2024-09-07 | Stop reason: HOSPADM

## 2024-09-03 RX ORDER — MORPHINE SULFATE 4 MG/ML
4 INJECTION INTRAVENOUS EVERY 4 HOURS PRN
Status: DISCONTINUED | OUTPATIENT
Start: 2024-09-03 | End: 2024-09-07 | Stop reason: HOSPADM

## 2024-09-03 RX ORDER — BUPROPION HYDROCHLORIDE 150 MG/1
300 TABLET ORAL DAILY
Status: DISCONTINUED | OUTPATIENT
Start: 2024-09-03 | End: 2024-09-07 | Stop reason: HOSPADM

## 2024-09-03 RX ORDER — TIZANIDINE 4 MG/1
TABLET ORAL
COMMUNITY

## 2024-09-03 RX ORDER — TAMSULOSIN HYDROCHLORIDE 0.4 MG/1
0.4 CAPSULE ORAL DAILY
Status: DISCONTINUED | OUTPATIENT
Start: 2024-09-03 | End: 2024-09-07 | Stop reason: HOSPADM

## 2024-09-03 RX ORDER — HYDRALAZINE HYDROCHLORIDE 20 MG/ML
10 INJECTION INTRAMUSCULAR; INTRAVENOUS EVERY 4 HOURS PRN
Status: DISCONTINUED | OUTPATIENT
Start: 2024-09-03 | End: 2024-09-07 | Stop reason: HOSPADM

## 2024-09-03 RX ORDER — ACETAMINOPHEN 650 MG/1
650 SUPPOSITORY RECTAL EVERY 4 HOURS PRN
Status: DISCONTINUED | OUTPATIENT
Start: 2024-09-03 | End: 2024-09-07 | Stop reason: HOSPADM

## 2024-09-03 RX ORDER — MORPHINE SULFATE 2 MG/ML
2 INJECTION, SOLUTION INTRAMUSCULAR; INTRAVENOUS EVERY 4 HOURS PRN
Status: DISCONTINUED | OUTPATIENT
Start: 2024-09-03 | End: 2024-09-07 | Stop reason: HOSPADM

## 2024-09-03 RX ORDER — PREGABALIN 50 MG/1
50 CAPSULE ORAL 3 TIMES DAILY
COMMUNITY
Start: 2024-08-06 | End: 2024-10-05

## 2024-09-03 RX ORDER — PROCHLORPERAZINE 25 MG/1
25 SUPPOSITORY RECTAL EVERY 12 HOURS PRN
Status: DISCONTINUED | OUTPATIENT
Start: 2024-09-03 | End: 2024-09-07 | Stop reason: HOSPADM

## 2024-09-03 RX ORDER — ACETAMINOPHEN 160 MG/5ML
650 SOLUTION ORAL EVERY 4 HOURS PRN
Status: DISCONTINUED | OUTPATIENT
Start: 2024-09-03 | End: 2024-09-07 | Stop reason: HOSPADM

## 2024-09-03 RX ORDER — PROCHLORPERAZINE MALEATE 5 MG
10 TABLET ORAL EVERY 6 HOURS PRN
Status: DISCONTINUED | OUTPATIENT
Start: 2024-09-03 | End: 2024-09-07 | Stop reason: HOSPADM

## 2024-09-03 RX ORDER — PREGABALIN 50 MG/1
50 CAPSULE ORAL DAILY
Status: DISCONTINUED | OUTPATIENT
Start: 2024-09-03 | End: 2024-09-07 | Stop reason: HOSPADM

## 2024-09-03 RX ORDER — FLUTICASONE PROPIONATE 50 MCG
2 SPRAY, SUSPENSION (ML) NASAL DAILY PRN
Status: DISCONTINUED | OUTPATIENT
Start: 2024-09-03 | End: 2024-09-07 | Stop reason: HOSPADM

## 2024-09-03 RX ORDER — METOPROLOL TARTRATE 1 MG/ML
5 INJECTION, SOLUTION INTRAVENOUS EVERY 6 HOURS
Status: DISCONTINUED | OUTPATIENT
Start: 2024-09-03 | End: 2024-09-06

## 2024-09-03 RX ORDER — VENLAFAXINE HYDROCHLORIDE 37.5 MG/1
37.5 CAPSULE, EXTENDED RELEASE ORAL DAILY
Status: DISCONTINUED | OUTPATIENT
Start: 2024-09-03 | End: 2024-09-07 | Stop reason: HOSPADM

## 2024-09-03 RX ORDER — ALLOPURINOL 300 MG/1
300 TABLET ORAL DAILY
Status: DISCONTINUED | OUTPATIENT
Start: 2024-09-03 | End: 2024-09-07 | Stop reason: HOSPADM

## 2024-09-03 RX ORDER — ACETAMINOPHEN 325 MG/1
650 TABLET ORAL EVERY 4 HOURS PRN
Status: DISCONTINUED | OUTPATIENT
Start: 2024-09-03 | End: 2024-09-07 | Stop reason: HOSPADM

## 2024-09-03 RX ORDER — DEXTROSE MONOHYDRATE AND SODIUM CHLORIDE 5; .45 G/100ML; G/100ML
75 INJECTION, SOLUTION INTRAVENOUS CONTINUOUS
Status: DISCONTINUED | OUTPATIENT
Start: 2024-09-03 | End: 2024-09-06

## 2024-09-03 RX ORDER — ENOXAPARIN SODIUM 100 MG/ML
40 INJECTION SUBCUTANEOUS EVERY 24 HOURS
Status: DISCONTINUED | OUTPATIENT
Start: 2024-09-03 | End: 2024-09-07 | Stop reason: HOSPADM

## 2024-09-03 RX ORDER — CIPROFLOXACIN 2 MG/ML
400 INJECTION, SOLUTION INTRAVENOUS ONCE
Status: DISCONTINUED | OUTPATIENT
Start: 2024-09-03 | End: 2024-09-03

## 2024-09-03 RX ORDER — MORPHINE SULFATE 4 MG/ML
4 INJECTION INTRAVENOUS ONCE
Status: COMPLETED | OUTPATIENT
Start: 2024-09-03 | End: 2024-09-03

## 2024-09-03 RX ORDER — METRONIDAZOLE 500 MG/100ML
500 INJECTION, SOLUTION INTRAVENOUS ONCE
Status: COMPLETED | OUTPATIENT
Start: 2024-09-03 | End: 2024-09-03

## 2024-09-03 RX ORDER — ONDANSETRON HYDROCHLORIDE 2 MG/ML
4 INJECTION, SOLUTION INTRAVENOUS ONCE
Status: COMPLETED | OUTPATIENT
Start: 2024-09-03 | End: 2024-09-03

## 2024-09-03 SDOH — SOCIAL STABILITY: SOCIAL INSECURITY: HAS ANYONE EVER THREATENED TO HURT YOUR FAMILY OR YOUR PETS?: NO

## 2024-09-03 SDOH — SOCIAL STABILITY: SOCIAL INSECURITY: DO YOU FEEL UNSAFE GOING BACK TO THE PLACE WHERE YOU ARE LIVING?: NO

## 2024-09-03 SDOH — SOCIAL STABILITY: SOCIAL INSECURITY: HAVE YOU HAD ANY THOUGHTS OF HARMING ANYONE ELSE?: NO

## 2024-09-03 SDOH — SOCIAL STABILITY: SOCIAL INSECURITY: DO YOU FEEL ANYONE HAS EXPLOITED OR TAKEN ADVANTAGE OF YOU FINANCIALLY OR OF YOUR PERSONAL PROPERTY?: NO

## 2024-09-03 SDOH — SOCIAL STABILITY: SOCIAL INSECURITY: ABUSE: ADULT

## 2024-09-03 SDOH — SOCIAL STABILITY: SOCIAL INSECURITY: ARE THERE ANY APPARENT SIGNS OF INJURIES/BEHAVIORS THAT COULD BE RELATED TO ABUSE/NEGLECT?: NO

## 2024-09-03 SDOH — SOCIAL STABILITY: SOCIAL INSECURITY: DOES ANYONE TRY TO KEEP YOU FROM HAVING/CONTACTING OTHER FRIENDS OR DOING THINGS OUTSIDE YOUR HOME?: NO

## 2024-09-03 SDOH — SOCIAL STABILITY: SOCIAL INSECURITY: ARE YOU OR HAVE YOU BEEN THREATENED OR ABUSED PHYSICALLY, EMOTIONALLY, OR SEXUALLY BY ANYONE?: NO

## 2024-09-03 SDOH — SOCIAL STABILITY: SOCIAL INSECURITY: HAVE YOU HAD THOUGHTS OF HARMING ANYONE ELSE?: NO

## 2024-09-03 SDOH — SOCIAL STABILITY: SOCIAL INSECURITY: WERE YOU ABLE TO COMPLETE ALL THE BEHAVIORAL HEALTH SCREENINGS?: YES

## 2024-09-03 ASSESSMENT — COGNITIVE AND FUNCTIONAL STATUS - GENERAL
MOBILITY SCORE: 23
WALKING IN HOSPITAL ROOM: A LITTLE
CLIMB 3 TO 5 STEPS WITH RAILING: A LITTLE
DAILY ACTIVITIY SCORE: 24
DAILY ACTIVITIY SCORE: 24
CLIMB 3 TO 5 STEPS WITH RAILING: A LITTLE
MOBILITY SCORE: 22
PATIENT BASELINE BEDBOUND: NO

## 2024-09-03 ASSESSMENT — PAIN SCALES - GENERAL
PAINLEVEL_OUTOF10: 10 - WORST POSSIBLE PAIN
PAINLEVEL_OUTOF10: 2
PAINLEVEL_OUTOF10: 8

## 2024-09-03 ASSESSMENT — COLUMBIA-SUICIDE SEVERITY RATING SCALE - C-SSRS
1. IN THE PAST MONTH, HAVE YOU WISHED YOU WERE DEAD OR WISHED YOU COULD GO TO SLEEP AND NOT WAKE UP?: NO
6. HAVE YOU EVER DONE ANYTHING, STARTED TO DO ANYTHING, OR PREPARED TO DO ANYTHING TO END YOUR LIFE?: NO
2. HAVE YOU ACTUALLY HAD ANY THOUGHTS OF KILLING YOURSELF?: NO

## 2024-09-03 ASSESSMENT — LIFESTYLE VARIABLES
AUDIT-C TOTAL SCORE: 0
HOW MANY STANDARD DRINKS CONTAINING ALCOHOL DO YOU HAVE ON A TYPICAL DAY: PATIENT DOES NOT DRINK
HOW OFTEN DO YOU HAVE A DRINK CONTAINING ALCOHOL: NEVER
HOW OFTEN DO YOU HAVE 6 OR MORE DRINKS ON ONE OCCASION: NEVER
AUDIT-C TOTAL SCORE: 0
SKIP TO QUESTIONS 9-10: 1

## 2024-09-03 ASSESSMENT — ACTIVITIES OF DAILY LIVING (ADL)
ADEQUATE_TO_COMPLETE_ADL: YES
GROOMING: INDEPENDENT
HEARING - RIGHT EAR: FUNCTIONAL
HEARING - LEFT EAR: FUNCTIONAL
JUDGMENT_ADEQUATE_SAFELY_COMPLETE_DAILY_ACTIVITIES: YES
FEEDING YOURSELF: INDEPENDENT
WALKS IN HOME: INDEPENDENT
BATHING: INDEPENDENT
TOILETING: INDEPENDENT
DRESSING YOURSELF: INDEPENDENT
LACK_OF_TRANSPORTATION: NO
PATIENT'S MEMORY ADEQUATE TO SAFELY COMPLETE DAILY ACTIVITIES?: YES

## 2024-09-03 ASSESSMENT — PATIENT HEALTH QUESTIONNAIRE - PHQ9
2. FEELING DOWN, DEPRESSED OR HOPELESS: NOT AT ALL
1. LITTLE INTEREST OR PLEASURE IN DOING THINGS: NOT AT ALL
SUM OF ALL RESPONSES TO PHQ9 QUESTIONS 1 & 2: 0

## 2024-09-03 ASSESSMENT — PAIN DESCRIPTION - LOCATION: LOCATION: ABDOMEN

## 2024-09-03 ASSESSMENT — PAIN - FUNCTIONAL ASSESSMENT
PAIN_FUNCTIONAL_ASSESSMENT: 0-10

## 2024-09-03 ASSESSMENT — PAIN DESCRIPTION - ORIENTATION: ORIENTATION: LEFT;LOWER

## 2024-09-03 NOTE — H&P
History Of Present Illness  Durga Romero is a 64 y.o. male with past medical history of sciatica, hypertension, who came to the hospital secondary to left-sided abdominal pain.  Patient reports having pain yesterday and that he used heat on his abdomen which helped relieve the pain.  He states at 2 AM this morning, the pain came back and was very severe.  He states pain is in the left lower part of his abdomen and does radiate toward the midline.  Patient reports having nausea, vomiting earlier today.  He reports having chills.  Patient reports having diarrhea couple days ago, but this resolved.  He denies any black or bloody stools.  Patient called his PCPs office and was instructed to come to the hospital.  Patient does report having weak urinary stream and had been urinating hourly.  Patient denies history of having diverticulitis.  He was seen by his PCP on 8/30 and was started on cephalexin 500 mg twice daily and urinalysis was benign on 8/30/2024.  Patient had also complained of low back pain with radiculopathy/sciatic pain and was placed on prednisone and gabapentin was added to his medications and patient is chronically on Lyrica as well.  Additionally, PCP started patient on olmesartan 20 mg daily in addition to his chronic carvedilol due to uncontrolled hypertension.  Patient reports having chills at home and denies chest pain, shortness of breath.  He does report having some swelling primarily his left leg recently as well.  She states is actually getting better.  Otherwise, 10 point review of systems is benign.     Colonoscopy on 1/18/2016 with moderate diverticulosis in the sigmoid colon; no evidence of diverticular bleeding; nonbleeding internal hemorrhoids; examination was otherwise normal.    Past Medical History  Past Medical History:   Diagnosis Date    Chronic maxillary sinusitis 12/18/2019    Left maxillary sinusitis    Contact with and (suspected) exposure to other viral communicable  diseases 03/21/2020    Exposure to influenza    Dizziness and giddiness 01/14/2015    Lightheadedness    Myalgia, other site 06/26/2019    Buttock pain    Noninfective gastroenteritis and colitis, unspecified 05/23/2016    Chronic diarrhea of unknown origin    Other forms of dyspnea 02/15/2019    Dyspnea on exertion    Other headache syndrome 01/14/2015    Chronic mixed headache syndrome    Other intervertebral disc displacement, lumbar region 12/11/2017    Lumbar herniated disc    Other microscopic hematuria 06/11/2020    Other microscopic hematuria    Pain in right ankle and joints of right foot 05/23/2016    Arthralgia of right foot    Personal history of other diseases of the musculoskeletal system and connective tissue 12/04/2017    History of low back pain    Personal history of other diseases of the nervous system and sense organs 10/07/2019    History of blurred vision    Personal history of other diseases of the nervous system and sense organs 11/21/2016    History of cataract    Personal history of other diseases of the respiratory system 10/07/2015    History of pharyngitis    Personal history of other specified conditions 06/09/2016    History of epigastric pain    Personal history of other specified conditions 02/16/2015    History of urinary frequency    Personal history of other specified conditions 01/26/2015    History of urinary retention    Personal history of other specified conditions 01/14/2015    History of memory loss    Personal history of other specified conditions 01/29/2020    History of vertigo    Radiculopathy, lumbar region 12/11/2017    Lumbar radiculitis    Strain of muscle and tendon of back wall of thorax, initial encounter 07/22/2014    Strain of thoracic spine    Syncope and collapse 10/09/2019    Near syncope       Surgical History  Past Surgical History:   Procedure Laterality Date    HERNIA REPAIR  06/21/2013    Hernia Repair Inguinal Sliding        Social History  He reports  "that he has never smoked. He has never used smokeless tobacco. He reports that he does not currently use alcohol. He reports that he does not use drugs.    Family History  No family history on file.     Allergies  Patient has no known allergies.    Review of Systems  -As stated in the HPI.  Otherwise, 10 point review of systems is benign.    Physical Exam  General: Patient is alert. No acute distress.   HEENT: Sclera clear.  CVS: RRR.  Lungs: CTAB.   Abdomen: Soft.  Left lower quadrant abdominal tenderness to palpation with no guarding.  Bowel sounds present.    Extremities: No pitting edema bilateral ankles.  Psychiatric: Cooperative.  Last Recorded Vitals  Blood pressure (!) 173/114, pulse 82, temperature 36.4 °C (97.5 °F), temperature source Temporal, resp. rate 18, height 1.778 m (5' 10\"), weight 102 kg (225 lb), SpO2 95%.    Relevant Results      Results for orders placed or performed during the hospital encounter of 09/03/24 (from the past 24 hour(s))   CBC and Auto Differential   Result Value Ref Range    WBC 10.3 4.4 - 11.3 x10*3/uL    nRBC 0.0 0.0 - 0.0 /100 WBCs    RBC 4.54 4.50 - 5.90 x10*6/uL    Hemoglobin 14.3 13.5 - 17.5 g/dL    Hematocrit 42.2 41.0 - 52.0 %    MCV 93 80 - 100 fL    MCH 31.5 26.0 - 34.0 pg    MCHC 33.9 32.0 - 36.0 g/dL    RDW 14.5 11.5 - 14.5 %    Platelets 156 150 - 450 x10*3/uL    Neutrophils % 77.3 40.0 - 80.0 %    Immature Granulocytes %, Automated 0.4 0.0 - 0.9 %    Lymphocytes % 12.3 13.0 - 44.0 %    Monocytes % 9.6 2.0 - 10.0 %    Eosinophils % 0.2 0.0 - 6.0 %    Basophils % 0.2 0.0 - 2.0 %    Neutrophils Absolute 7.97 (H) 1.20 - 7.70 x10*3/uL    Immature Granulocytes Absolute, Automated 0.04 0.00 - 0.70 x10*3/uL    Lymphocytes Absolute 1.27 1.20 - 4.80 x10*3/uL    Monocytes Absolute 0.99 0.10 - 1.00 x10*3/uL    Eosinophils Absolute 0.02 0.00 - 0.70 x10*3/uL    Basophils Absolute 0.02 0.00 - 0.10 x10*3/uL   Magnesium   Result Value Ref Range    Magnesium 1.72 1.60 - 2.40 mg/dL "   Comprehensive metabolic panel   Result Value Ref Range    Glucose 119 (H) 74 - 99 mg/dL    Sodium 137 136 - 145 mmol/L    Potassium 3.8 3.5 - 5.3 mmol/L    Chloride 102 98 - 107 mmol/L    Bicarbonate 24 21 - 32 mmol/L    Anion Gap 15 10 - 20 mmol/L    Urea Nitrogen 20 6 - 23 mg/dL    Creatinine 1.12 0.50 - 1.30 mg/dL    eGFR 74 >60 mL/min/1.73m*2    Calcium 9.6 8.6 - 10.3 mg/dL    Albumin 3.7 3.4 - 5.0 g/dL    Alkaline Phosphatase 41 33 - 136 U/L    Total Protein 6.5 6.4 - 8.2 g/dL    AST 31 9 - 39 U/L    Bilirubin, Total 1.1 0.0 - 1.2 mg/dL    ALT 70 (H) 10 - 52 U/L   Lactate   Result Value Ref Range    Lactate 1.3 0.4 - 2.0 mmol/L   Urinalysis with Reflex Culture and Microscopic   Result Value Ref Range    Color, Urine Colorless (N) Light-Yellow, Yellow, Dark-Yellow    Appearance, Urine Clear Clear    Specific Gravity, Urine 1.019 1.005 - 1.035    pH, Urine 6.0 5.0, 5.5, 6.0, 6.5, 7.0, 7.5, 8.0    Protein, Urine NEGATIVE NEGATIVE, 10 (TRACE), 20 (TRACE) mg/dL    Glucose, Urine Normal Normal mg/dL    Blood, Urine NEGATIVE NEGATIVE    Ketones, Urine NEGATIVE NEGATIVE mg/dL    Bilirubin, Urine NEGATIVE NEGATIVE    Urobilinogen, Urine Normal Normal mg/dL    Nitrite, Urine NEGATIVE NEGATIVE    Leukocyte Esterase, Urine NEGATIVE NEGATIVE        CT abdomen pelvis w IV contrast    Result Date: 9/3/2024  Interpreted By:  Mary Chang, STUDY: CT ABDOMEN PELVIS W IV CONTRAST;  9/3/2024 10:56 am   INDICATION: 63 y/o   M with  Signs/Symptoms:abd pain.   LIMITATIONS: None.   ACCESSION NUMBER(S): [id number]   ORDERING CLINICIAN: ARTURO WOODS   TECHNIQUE: After the administration of IV iodonated contrast, spiral axial images were obtained from the xiphoid down through the symphysis pubis. Sagittal and coronal reconstruction images were generated. 75 mL of Omnipaque 350.   COMPARISON: 06/22/2016.   FINDINGS: Lower Chest: Streaky bibasilar atelectasis.   Liver: The liver is unremarkable without focal lesion.    Gallbladder and Biliary: Unremarkable.   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   Spleen: No abnormality identified in the spleen.   Adrenals: No abnormality identified in either adrenal gland.   Urinary: Subcentimeter hypodensity in the lower pole right kidney, too small to characterize. No hydronephrosis.   Gastrointestinal/Peritoneum: No small or large bowel obstruction in the visualized abdomen. Small foci of free air in the upper abdomen. Sigmoid colon diverticulosis. Extensive perisigmoid fat stranding with small foci of free air adjacent to the sigmoid colon with confluence areas of stranding and small pockets of non rim enhancing fluid. Diffuse colonic diverticulosis. Fluid in the right side of the colon. No evidence of acute appendicitis. Small bowel wall thickening in the perisigmoid region.   Vascular: Abdominal aorta is normal in caliber. Trace atherosclerosis.   Lymphatics: No enlarged lymph nodes by size criteria.   MSK/Body Wall: No aggressive bony lesion identified. Multilevel degenerative changes in the spine.       Perforated sigmoid diverticulitis with inflammatory changes in the perisigmoid region with small pockets of free fluid and pneumoperitoneum. No loculated abscess at this time.   Diffuse colonic diverticulosis.   Small bowel wall thickening in the perisigmoid region favored reactive.   Mary Chang sent epic message to  ARTURO WOODS on 9/3/2024 at 11:26 am.  (**-F-**) Findings:  See findings.     Signed by: Mary Chang 9/3/2024 11:41 AM Dictation workstation:   [id number]         Assessment/Plan   Assessment & Plan  Acute diverticulitis    Diverticulitis of colon with perforation      Patient is a 64-year-old male with past medical history of sciatica, hypertension, who came to the hospital secondary to left-sided abdominal pain and found to have perforated sigmoid diverticulitis.    Perforated sigmoid diverticulitis  -General Surgery  consulted and patient seen by Dr. Garcia in the ER, who recommends attempting medical management at this time, but if not successful patient will need ex lap.   -Will continue IV Zosyn that was started in the ER.  -Consult ID.  -Keep n.p.o.; give IV fluids, antiemetics as needed, pain medications as needed.  -Monitor.    Hypertension  -BP is elevated.  This may be related to pain but also it appears patient is likely uncontrolled as an outpatient.  PCP had started patient on olmesartan on 8/30 in addition to his chronic med of carvedilol.  -As patient is n.p.o., will place patient on IV Lopressor 5 mg every 6 hours and can give IV hydralazine every 4 hours as needed.  -Monitor.    Prostatitis  -Patient started on cephalexin on 8/30.  Urinalysis at the time was benign.  Patient on IV Zosyn for perforated sigmoid diverticulitis.  Urinalysis on admission also benign for infection.  -Continue home med of tamsulosin.  -Monitor.    Low back pain with radiculopathy  -Patient is followed by pain management and chronically on Lyrica.  PCP started patient on prednisone and added gabapentin on 8/30.  -We will hold prednisone and gabapentin at this time especially as patient is NPO.  Continue Lyrica when patient taking orals.  -Follow-up with pain management and PCP.    DVT prophylaxis  -Lovenox subcu.    Nghia Gregg DO

## 2024-09-03 NOTE — CONSULTS
Consults  Referred by MATTIE Gregg    Primary MD: Fartun Dickerson, DO    Reason For Consult  Diverticulitis    History Of Present Illness  Durga Romero is a 64 y.o. male, hx of HTN, hx of sciatica, he was started on prednisone few days earlier for sciatica, he was admitted for LLQ abdominal pain, sever, sharp, no radiation, no modifying factors, associated with nausea, emesis and diarrhea, no bleeding, no fever, started 1 day PTA, initially improved spontaneously then recurred early morning, the WBC are N, the CT with diverticulosis, inflammation and small fluid collection perisigmoid.     Past Medical History  He has a past medical history of Chronic maxillary sinusitis (12/18/2019), Contact with and (suspected) exposure to other viral communicable diseases (03/21/2020), Dizziness and giddiness (01/14/2015), Myalgia, other site (06/26/2019), Noninfective gastroenteritis and colitis, unspecified (05/23/2016), Other forms of dyspnea (02/15/2019), Other headache syndrome (01/14/2015), Other intervertebral disc displacement, lumbar region (12/11/2017), Other microscopic hematuria (06/11/2020), Pain in right ankle and joints of right foot (05/23/2016), Personal history of other diseases of the musculoskeletal system and connective tissue (12/04/2017), Personal history of other diseases of the nervous system and sense organs (10/07/2019), Personal history of other diseases of the nervous system and sense organs (11/21/2016), Personal history of other diseases of the respiratory system (10/07/2015), Personal history of other specified conditions (06/09/2016), Personal history of other specified conditions (02/16/2015), Personal history of other specified conditions (01/26/2015), Personal history of other specified conditions (01/14/2015), Personal history of other specified conditions (01/29/2020), Radiculopathy, lumbar region (12/11/2017), Strain of muscle and tendon of back wall of thorax, initial encounter  (07/22/2014), and Syncope and collapse (10/09/2019).    Surgical History  He has a past surgical history that includes Hernia repair (06/21/2013).     Social History     Occupational History    Not on file   Tobacco Use    Smoking status: Never    Smokeless tobacco: Never   Vaping Use    Vaping status: Never Used   Substance and Sexual Activity    Alcohol use: Not Currently    Drug use: Never    Sexual activity: Not on file     Travel History   Travel since 08/03/24    No documented travel since 08/03/24            Family History  No family history on file., no immunodeficiency  Allergies  Patient has no known allergies.     Immunization History   Administered Date(s) Administered    Flu vaccine (IIV4), preservative free *Check age/dose* 11/21/2023    Influenza, Unspecified 09/14/2020    Pfizer Purple Cap SARS-CoV-2 03/21/2021, 04/18/2021, 12/17/2021    Tdap vaccine, age 7 year and older (BOOSTRIX, ADACEL) 06/17/2020    Zoster vaccine, recombinant, adult (SHINGRIX) 10/23/2022, 01/21/2023     Medications  Home medications:  Medications Prior to Admission   Medication Sig Dispense Refill Last Dose    allopurinol (Zyloprim) 300 mg tablet TAKE 1 TABLET DAILY 90 tablet 1 9/2/2024 at am    buPROPion XL (Wellbutrin XL) 300 mg 24 hr tablet TAKE 1 TABLET DAILY 90 tablet 1 9/2/2024 at am    carvedilol (Coreg) 12.5 mg tablet TAKE 1 TABLET TWICE A DAY WITH MEALS 180 tablet 1 9/2/2024 at am    cephalexin (Keflex) 500 mg capsule Take 1 capsule (500 mg) by mouth 2 times a day for 14 days. 28 capsule 0 9/2/2024 at pm    cholecalciferol, vitamin D3, 10 mcg (400 unit) capsule Take by mouth.   9/2/2024 at am    fish oil concentrate (Omega-3) 120-180 mg capsule Take by mouth.   9/2/2024 at am    gabapentin (Neurontin) 300 mg capsule Take 1 capsule (300 mg) by mouth 3 times a day. 90 capsule 1 9/2/2024 at am    olmesartan (BENIcar) 20 mg tablet Take 1 tablet (20 mg) by mouth once daily. 30 tablet 5 9/2/2024 at am    predniSONE  "(Deltasone) 10 mg tablet Take 1 tab 3 times daily for 3 days, then twice daily for 3 days, then 1 daily 18 tablet 0 9/2/2024 at am    pregabalin (Lyrica) 50 mg capsule Take 1 capsule (50 mg) by mouth 3 times a day.   Past Month    rosuvastatin (Crestor) 10 mg tablet TAKE 1 TABLET DAILY 90 tablet 1 9/2/2024 at am    tamsulosin (Flomax) 0.4 mg 24 hr capsule Take 1 capsule (0.4 mg) by mouth once daily. 30 capsule 1 9/2/2024 at am    tiZANidine (Zanaflex) 4 mg tablet TAKE 1/2 TO 1 (ONE-HALF TO ONE) TABLET BY MOUTH THREE TIMES DAILY   9/2/2024 at pm    venlafaxine XR (Effexor-XR) 37.5 mg 24 hr capsule Take 1 capsule (37.5 mg) by mouth once daily. Do not crush or chew. 30 capsule 0 9/2/2024 at am    fluticasone (Flonase) 50 mcg/actuation nasal spray Administer 2 sprays into affected nostril(s) once daily as needed.   Unknown    sildenafil (Viagra) 100 mg tablet Take 1 tablet (100 mg) by mouth if needed.   Unknown     Current medications:  Scheduled medications  allopurinol, 300 mg, oral, Daily  buPROPion XL, 300 mg, oral, Daily  enoxaparin, 40 mg, subcutaneous, q24h  metoprolol, 5 mg, intravenous, q6h  piperacillin-tazobactam, 3.375 g, intravenous, Once  piperacillin-tazobactam, 3.375 g, intravenous, q6h  pregabalin, 50 mg, oral, Daily  tamsulosin, 0.4 mg, oral, Daily  venlafaxine XR, 37.5 mg, oral, Daily      Continuous medications  dextrose 5%-0.45 % sodium chloride, 75 mL/hr, Last Rate: 75 mL/hr (09/03/24 1354)      PRN medications  PRN medications: acetaminophen **OR** acetaminophen **OR** acetaminophen, fluticasone, hydrALAZINE, morphine, morphine, prochlorperazine **OR** prochlorperazine **OR** prochlorperazine    Review of Systems   All other systems reviewed and are negative.       Objective  Range of Vitals (last 24 hours)  Heart Rate:  []   Temp:  [36.4 °C (97.5 °F)-36.6 °C (97.9 °F)]   Resp:  [18-20]   BP: (143-204)/()   Height:  [177.8 cm (5' 10\")]   Weight:  [102 kg (225 lb)]   SpO2:  [95 %-97 %] " "  Daily Weight  09/03/24 : 102 kg (225 lb)    Body mass index is 32.28 kg/m².     Physical Exam  Constitutional:       Appearance: Normal appearance.   HENT:      Head: Normocephalic and atraumatic.      Mouth/Throat:      Mouth: Mucous membranes are moist.      Pharynx: Oropharynx is clear.   Eyes:      Pupils: Pupils are equal, round, and reactive to light.   Cardiovascular:      Rate and Rhythm: Normal rate and regular rhythm.      Heart sounds: Normal heart sounds.   Pulmonary:      Effort: Pulmonary effort is normal.      Breath sounds: Normal breath sounds.   Abdominal:      General: Abdomen is flat. Bowel sounds are normal.      Palpations: Abdomen is soft.      Tenderness: There is abdominal tenderness.   Musculoskeletal:      Cervical back: Normal range of motion.   Neurological:      Mental Status: He is alert.          Relevant Results  Outside Hospital Results  reviewed  Labs  Results from last 72 hours   Lab Units 09/03/24  1017   WBC AUTO x10*3/uL 10.3   HEMOGLOBIN g/dL 14.3   HEMATOCRIT % 42.2   PLATELETS AUTO x10*3/uL 156   NEUTROS PCT AUTO % 77.3   LYMPHS PCT AUTO % 12.3   MONOS PCT AUTO % 9.6   EOS PCT AUTO % 0.2     Results from last 72 hours   Lab Units 09/03/24  1017   SODIUM mmol/L 137   POTASSIUM mmol/L 3.8   CHLORIDE mmol/L 102   CO2 mmol/L 24   BUN mg/dL 20   CREATININE mg/dL 1.12   GLUCOSE mg/dL 119*   CALCIUM mg/dL 9.6   ANION GAP mmol/L 15   EGFR mL/min/1.73m*2 74     Results from last 72 hours   Lab Units 09/03/24  1017   ALK PHOS U/L 41   BILIRUBIN TOTAL mg/dL 1.1   PROTEIN TOTAL g/dL 6.5   ALT U/L 70*   AST U/L 31   ALBUMIN g/dL 3.7     Estimated Creatinine Clearance: 81.8 mL/min (by C-G formula based on SCr of 1.12 mg/dL).  C-Reactive Protein   Date Value Ref Range Status   10/03/2023 0.22 <1.00 mg/dL Final     Sedimentation Rate   Date Value Ref Range Status   10/03/2023 7 0 - 20 mm/h Final   10/05/2020 3 0 - 20 mm/h Final     No results found for: \"HIV1X2\", \"HIVCONF\", " "\"[id number]\"  No results found for: \"HEPCABINIT\", \"HEPCAB\", \"HCVPCRQUANT\"  Microbiology  Reviewed  Imaging  Reviewed      Assessment/Plan     Perforated sigmoid diverticulitis    Recommendations :  Continue Zosyn  Bowel rest  Incentive spirometry  Follow the labs    I spent minutes in the professional and overall care of this patient.      Jose Alfredo Mcmillan MD  "

## 2024-09-03 NOTE — PROGRESS NOTES
09/03/24 1127   Discharge Planning   Living Arrangements Spouse/significant other   Support Systems Spouse/significant other   Assistance Needed A&OX3; independent with ADLs with cane recently (secondary to back pain); drives; room air baseline and currently room air   Type of Residence Private residence   Number of Stairs to Enter Residence 2   Number of Stairs Within Residence 14   Do you have animals or pets at home? No   Who is requesting discharge planning? Provider   Expected Discharge Disposition Home  (Patient denies home going needs at this time)   Does the patient need discharge transport arranged? No   Financial Resource Strain   How hard is it for you to pay for the very basics like food, housing, medical care, and heating? Not hard   Housing Stability   In the last 12 months, was there a time when you were not able to pay the mortgage or rent on time? N   In the past 12 months, how many times have you moved where you were living? 1   At any time in the past 12 months, were you homeless or living in a shelter (including now)? N   Transportation Needs   In the past 12 months, has lack of transportation kept you from medical appointments or from getting medications? no   In the past 12 months, has lack of transportation kept you from meetings, work, or from getting things needed for daily living? No     09/03/2024 1128am  Spoke with patient bedside in ED

## 2024-09-03 NOTE — ED PROVIDER NOTES
HPI   Chief Complaint   Patient presents with    Abdominal Pain    Nausea       64 year old male here with chief complaint of LLQ abdominal pain, since 2 a.m. Started on Keflex, gabapentin and a steroids for chronic prostatitis and sciatica on Friday. No fevers or chills. Pain is constantly 9 out of 10, nothing makes it better or worse.  Patient's been nauseous had 1 episode of vomiting and 1 episode of diarrhea.  No other sick contacts.    Past medical history includes sinusitis myalgias gastroenteritis does not smoke drink or use any street drug              Patient History   Past Medical History:   Diagnosis Date    Chronic maxillary sinusitis 12/18/2019    Left maxillary sinusitis    Contact with and (suspected) exposure to other viral communicable diseases 03/21/2020    Exposure to influenza    Dizziness and giddiness 01/14/2015    Lightheadedness    Myalgia, other site 06/26/2019    Buttock pain    Noninfective gastroenteritis and colitis, unspecified 05/23/2016    Chronic diarrhea of unknown origin    Other forms of dyspnea 02/15/2019    Dyspnea on exertion    Other headache syndrome 01/14/2015    Chronic mixed headache syndrome    Other intervertebral disc displacement, lumbar region 12/11/2017    Lumbar herniated disc    Other microscopic hematuria 06/11/2020    Other microscopic hematuria    Pain in right ankle and joints of right foot 05/23/2016    Arthralgia of right foot    Personal history of other diseases of the musculoskeletal system and connective tissue 12/04/2017    History of low back pain    Personal history of other diseases of the nervous system and sense organs 10/07/2019    History of blurred vision    Personal history of other diseases of the nervous system and sense organs 11/21/2016    History of cataract    Personal history of other diseases of the respiratory system 10/07/2015    History of pharyngitis    Personal history of other specified conditions 06/09/2016    History of  epigastric pain    Personal history of other specified conditions 02/16/2015    History of urinary frequency    Personal history of other specified conditions 01/26/2015    History of urinary retention    Personal history of other specified conditions 01/14/2015    History of memory loss    Personal history of other specified conditions 01/29/2020    History of vertigo    Radiculopathy, lumbar region 12/11/2017    Lumbar radiculitis    Strain of muscle and tendon of back wall of thorax, initial encounter 07/22/2014    Strain of thoracic spine    Syncope and collapse 10/09/2019    Near syncope     Past Surgical History:   Procedure Laterality Date    HERNIA REPAIR  06/21/2013    Hernia Repair Inguinal Sliding     No family history on file.  Social History     Tobacco Use    Smoking status: Never    Smokeless tobacco: Never   Vaping Use    Vaping status: Never Used   Substance Use Topics    Alcohol use: Not Currently    Drug use: Never       Physical Exam   ED Triage Vitals [09/03/24 1000]   Temperature Heart Rate Respirations BP   36.6 °C (97.9 °F) (!) 112 20 (!) 204/118      Pulse Ox Temp src Heart Rate Source Patient Position   96 % -- -- --      BP Location FiO2 (%)     -- --       Physical Exam  Vitals and nursing note reviewed.   Constitutional:       Appearance: Normal appearance. He is ill-appearing.   HENT:      Head: Normocephalic and atraumatic.      Nose: Nose normal.      Mouth/Throat:      Mouth: Mucous membranes are moist.   Eyes:      Extraocular Movements: Extraocular movements intact.      Pupils: Pupils are equal, round, and reactive to light.   Cardiovascular:      Rate and Rhythm: Normal rate and regular rhythm.   Pulmonary:      Effort: Pulmonary effort is normal.      Breath sounds: Normal breath sounds.   Abdominal:      General: Abdomen is flat.      Palpations: Abdomen is soft.      Tenderness: There is abdominal tenderness in the left lower quadrant. There is guarding and rebound.    Musculoskeletal:         General: Normal range of motion.      Cervical back: Normal range of motion.   Skin:     General: Skin is warm and dry.      Capillary Refill: Capillary refill takes less than 2 seconds.   Neurological:      General: No focal deficit present.      Mental Status: He is alert.   Psychiatric:         Mood and Affect: Mood normal.           ED Course & MDM   Diagnoses as of 09/03/24 1214   Diverticulitis   Acute diverticulitis   Diverticulitis of colon with perforation                 No data recorded     Jerson Coma Scale Score: 15 (09/03/24 1008 : Durga Zhang RN)                           Medical Decision Making  Medical Decision Making: Patient has rebound tenderness and guarding on examination.  Left lower quadrant.  Workup shows baseline labs and CT abdomen pelvis shows a perforated sigmoid diverticulitis with inflammatory changes.  Zosyn and Flagyl were started.  Dr. Garcia consulted came down to the ED to see the patient.  We will hospitalize for further management and treatment.  Differential includes diverticulitis perforation pyelonephritis kidney stones  Considered renal  [unfilled]     Jesi Barrientos D.O.  Emergency Medicine          Procedure  Procedures     Jesi Barrientos,   09/03/24 1214
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
## 2024-09-03 NOTE — PROGRESS NOTES
Pharmacy Medication History Review    Durga Romero is a 64 y.o. male admitted for Acute diverticulitis. Pharmacy reviewed the patient's [id number] medications and allergies for accuracy.    The list below reflectives the updated PTA list. Please review each medication in order reconciliation for additional clarification and justification.  Prior to Admission Medications   Prescriptions Last Dose Informant Patient Reported? Taking?   allopurinol (Zyloprim) 300 mg tablet 9/2/2024 at am Self No Yes   Sig: TAKE 1 TABLET DAILY   buPROPion XL (Wellbutrin XL) 300 mg 24 hr tablet 9/2/2024 at am Self No Yes   Sig: TAKE 1 TABLET DAILY   carvedilol (Coreg) 12.5 mg tablet 9/2/2024 at am Self No Yes   Sig: TAKE 1 TABLET TWICE A DAY WITH MEALS   cephalexin (Keflex) 500 mg capsule 9/2/2024 at pm Self No Yes   Sig: Take 1 capsule (500 mg) by mouth 2 times a day for 14 days.   cholecalciferol, vitamin D3, 10 mcg (400 unit) capsule 9/2/2024 at am Self Yes Yes   Sig: Take by mouth.   fish oil concentrate (Omega-3) 120-180 mg capsule 9/2/2024 at am Self Yes Yes   Sig: Take by mouth.   fluticasone (Flonase) 50 mcg/actuation nasal spray Unknown Self Yes Yes   Sig: Administer 2 sprays into affected nostril(s) once daily as needed.   gabapentin (Neurontin) 300 mg capsule 9/2/2024 at am Self No Yes   Sig: Take 1 capsule (300 mg) by mouth 3 times a day.   olmesartan (BENIcar) 20 mg tablet 9/2/2024 at am Self No Yes   Sig: Take 1 tablet (20 mg) by mouth once daily.   predniSONE (Deltasone) 10 mg tablet 9/2/2024 at am Self No Yes   Sig: Take 1 tab 3 times daily for 3 days, then twice daily for 3 days, then 1 daily   pregabalin (Lyrica) 50 mg capsule Past Month Self Yes Yes   Sig: Take 1 capsule (50 mg) by mouth 3 times a day.   rosuvastatin (Crestor) 10 mg tablet 9/2/2024 at am Self No Yes   Sig: TAKE 1 TABLET DAILY   sildenafil (Viagra) 100 mg tablet Unknown Self Yes Yes   Sig: Take 1 tablet (100 mg) by mouth if needed.    tamsulosin (Flomax) 0.4 mg 24 hr capsule 9/2/2024 at am Self No Yes   Sig: Take 1 capsule (0.4 mg) by mouth once daily.   tiZANidine (Zanaflex) 4 mg tablet 9/2/2024 at pm Self Yes Yes   Sig: TAKE 1/2 TO 1 (ONE-HALF TO ONE) TABLET BY MOUTH THREE TIMES DAILY   venlafaxine XR (Effexor-XR) 37.5 mg 24 hr capsule 9/2/2024 at am Self No Yes   Sig: Take 1 capsule (37.5 mg) by mouth once daily. Do not crush or chew.      Facility-Administered Medications: None           The list below reflectives the updated allergy list. Please review each documented allergy for additional clarification and justification.  Allergies  Reviewed by Cindi Madison RN on 9/3/2024   No Known Allergies         Below are additional concerns with the patient's PTA list.      Liz Yung

## 2024-09-03 NOTE — TELEPHONE ENCOUNTER
Patient calling has had extreme abdominal pain since Saturday. The pain is so bad he can't move or get dressed.   Advised ER patient agreed with plan will go directly to the ER for further evaluation     Larisa Hayden RN

## 2024-09-04 LAB
ALBUMIN SERPL BCP-MCNC: 3.5 G/DL (ref 3.4–5)
ALP SERPL-CCNC: 43 U/L (ref 33–136)
ALT SERPL W P-5'-P-CCNC: 54 U/L (ref 10–52)
ANION GAP SERPL CALC-SCNC: 15 MMOL/L (ref 10–20)
AST SERPL W P-5'-P-CCNC: 22 U/L (ref 9–39)
BILIRUB SERPL-MCNC: 2.1 MG/DL (ref 0–1.2)
BUN SERPL-MCNC: 13 MG/DL (ref 6–23)
CALCIUM SERPL-MCNC: 8.8 MG/DL (ref 8.6–10.3)
CHLORIDE SERPL-SCNC: 100 MMOL/L (ref 98–107)
CO2 SERPL-SCNC: 24 MMOL/L (ref 21–32)
CREAT SERPL-MCNC: 1.17 MG/DL (ref 0.5–1.3)
EGFRCR SERPLBLD CKD-EPI 2021: 70 ML/MIN/1.73M*2
ERYTHROCYTE [DISTWIDTH] IN BLOOD BY AUTOMATED COUNT: 14.6 % (ref 11.5–14.5)
GLUCOSE SERPL-MCNC: 104 MG/DL (ref 74–99)
HCT VFR BLD AUTO: 42.3 % (ref 41–52)
HGB BLD-MCNC: 14.3 G/DL (ref 13.5–17.5)
MAGNESIUM SERPL-MCNC: 1.75 MG/DL (ref 1.6–2.4)
MCH RBC QN AUTO: 31.4 PG (ref 26–34)
MCHC RBC AUTO-ENTMCNC: 33.8 G/DL (ref 32–36)
MCV RBC AUTO: 93 FL (ref 80–100)
NRBC BLD-RTO: 0 /100 WBCS (ref 0–0)
PHOSPHATE SERPL-MCNC: 4 MG/DL (ref 2.5–4.9)
PLATELET # BLD AUTO: 161 X10*3/UL (ref 150–450)
POTASSIUM SERPL-SCNC: 3.8 MMOL/L (ref 3.5–5.3)
PROT SERPL-MCNC: 6.5 G/DL (ref 6.4–8.2)
RBC # BLD AUTO: 4.55 X10*6/UL (ref 4.5–5.9)
SODIUM SERPL-SCNC: 135 MMOL/L (ref 136–145)
WBC # BLD AUTO: 12.1 X10*3/UL (ref 4.4–11.3)

## 2024-09-04 PROCEDURE — 83735 ASSAY OF MAGNESIUM: CPT | Performed by: INTERNAL MEDICINE

## 2024-09-04 PROCEDURE — 2500000004 HC RX 250 GENERAL PHARMACY W/ HCPCS (ALT 636 FOR OP/ED): Performed by: NURSE PRACTITIONER

## 2024-09-04 PROCEDURE — 9420000001 HC RT PATIENT EDUCATION 5 MIN

## 2024-09-04 PROCEDURE — 99232 SBSQ HOSP IP/OBS MODERATE 35: CPT | Performed by: NURSE PRACTITIONER

## 2024-09-04 PROCEDURE — 84075 ASSAY ALKALINE PHOSPHATASE: CPT | Performed by: INTERNAL MEDICINE

## 2024-09-04 PROCEDURE — 94760 N-INVAS EAR/PLS OXIMETRY 1: CPT

## 2024-09-04 PROCEDURE — 2500000001 HC RX 250 WO HCPCS SELF ADMINISTERED DRUGS (ALT 637 FOR MEDICARE OP): Performed by: INTERNAL MEDICINE

## 2024-09-04 PROCEDURE — 2500000004 HC RX 250 GENERAL PHARMACY W/ HCPCS (ALT 636 FOR OP/ED): Performed by: INTERNAL MEDICINE

## 2024-09-04 PROCEDURE — 99232 SBSQ HOSP IP/OBS MODERATE 35: CPT | Performed by: INTERNAL MEDICINE

## 2024-09-04 PROCEDURE — 36415 COLL VENOUS BLD VENIPUNCTURE: CPT | Performed by: INTERNAL MEDICINE

## 2024-09-04 PROCEDURE — 1210000001 HC SEMI-PRIVATE ROOM DAILY

## 2024-09-04 PROCEDURE — 84100 ASSAY OF PHOSPHORUS: CPT | Performed by: INTERNAL MEDICINE

## 2024-09-04 PROCEDURE — 94660 CPAP INITIATION&MGMT: CPT

## 2024-09-04 PROCEDURE — 2500000005 HC RX 250 GENERAL PHARMACY W/O HCPCS: Performed by: INTERNAL MEDICINE

## 2024-09-04 PROCEDURE — 85027 COMPLETE CBC AUTOMATED: CPT | Performed by: INTERNAL MEDICINE

## 2024-09-04 PROCEDURE — 2500000002 HC RX 250 W HCPCS SELF ADMINISTERED DRUGS (ALT 637 FOR MEDICARE OP, ALT 636 FOR OP/ED): Performed by: INTERNAL MEDICINE

## 2024-09-04 ASSESSMENT — COGNITIVE AND FUNCTIONAL STATUS - GENERAL
DAILY ACTIVITIY SCORE: 24
MOBILITY SCORE: 24

## 2024-09-04 ASSESSMENT — PAIN SCALES - GENERAL
PAINLEVEL_OUTOF10: 0 - NO PAIN
PAINLEVEL_OUTOF10: 7
PAINLEVEL_OUTOF10: 8
PAINLEVEL_OUTOF10: 7
PAINLEVEL_OUTOF10: 8
PAINLEVEL_OUTOF10: 5 - MODERATE PAIN
PAINLEVEL_OUTOF10: 6
PAINLEVEL_OUTOF10: 3
PAINLEVEL_OUTOF10: 7

## 2024-09-04 ASSESSMENT — PAIN DESCRIPTION - LOCATION
LOCATION: ABDOMEN

## 2024-09-04 ASSESSMENT — PAIN - FUNCTIONAL ASSESSMENT
PAIN_FUNCTIONAL_ASSESSMENT: 0-10

## 2024-09-04 ASSESSMENT — PAIN DESCRIPTION - ORIENTATION
ORIENTATION: LEFT;LOWER

## 2024-09-04 NOTE — PROGRESS NOTES
Durga Romero is a 64 y.o. male on day 1 of admission presenting with Acute diverticulitis.    Subjective   Pt admitted with acute perforated diverticulitis without abscess seen on CT yesterday. Pt denies prior episode of diverticulitis.     No acute overnight events.   Pt reports dec pain in abd today compared to yesterday though still with pain in LLQ that is worse with bending, movement.   No n/v. Appetite still down.  No BM in a few days. Has not been passing gas in a couple days.   No fever or chills and no night sweats.   No aches.   No CP or SOB.   Did not use CPAP last night.        Objective     Physical Exam  Vitals reviewed.   Constitutional:       General: He is not in acute distress.     Appearance: Normal appearance. He is obese. He is not ill-appearing, toxic-appearing or diaphoretic.   HENT:      Head: Normocephalic and atraumatic.      Mouth/Throat:      Mouth: Mucous membranes are moist.   Eyes:      General: No scleral icterus.        Right eye: No discharge.         Left eye: No discharge.      Conjunctiva/sclera: Conjunctivae normal.   Cardiovascular:      Rate and Rhythm: Normal rate and regular rhythm.      Pulses: Normal pulses.      Heart sounds: Normal heart sounds. No murmur heard.     No friction rub. No gallop.   Pulmonary:      Effort: Pulmonary effort is normal. No respiratory distress.      Breath sounds: Normal breath sounds. No stridor. No wheezing, rhonchi or rales.   Chest:      Chest wall: No tenderness.   Abdominal:      Palpations: Abdomen is soft.      Tenderness: There is abdominal tenderness. There is guarding.      Comments: Hypoactive BS x 4 q. Tender to LUQ and LLQ.    Musculoskeletal:      Right lower leg: Edema present.      Left lower leg: Edema present.      Comments: + 1 pitting edema marco a lower ext.   Skin:     General: Skin is warm and dry.   Neurological:      Mental Status: He is alert and oriented to person, place, and time.      Gait: Gait normal.  "  Psychiatric:         Mood and Affect: Mood normal.         Behavior: Behavior normal.         Thought Content: Thought content normal.         Judgment: Judgment normal.         Last Recorded Vitals  Blood pressure 127/82, pulse 91, temperature 36.9 °C (98.4 °F), resp. rate 16, height 1.778 m (5' 10\"), weight 102 kg (225 lb), SpO2 96%.  At time of exam HR 93, pulse ox 94% on RA.    Intake/Output last 3 Shifts:  I/O last 3 completed shifts:  In: 2528.8 (24.8 mL/kg) [I.V.:1078.8 (10.6 mL/kg); IV Piggyback:1450]  Out: - (0 mL/kg)   Weight: 102.1 kg     Relevant Results    CT abdomen pelvis w IV contrast    Result Date: 9/3/2024  Interpreted By:  Mary Chang, STUDY: CT ABDOMEN PELVIS W IV CONTRAST;  9/3/2024 10:56 am   INDICATION: 63 y/o   M with  Signs/Symptoms:abd pain.   LIMITATIONS: None.   ACCESSION NUMBER(S): [id number]   ORDERING CLINICIAN: ARTURO WOODS   TECHNIQUE: After the administration of IV iodonated contrast, spiral axial images were obtained from the xiphoid down through the symphysis pubis. Sagittal and coronal reconstruction images were generated. 75 mL of Omnipaque 350.   COMPARISON: 06/22/2016.   FINDINGS: Lower Chest: Streaky bibasilar atelectasis.   Liver: The liver is unremarkable without focal lesion.   Gallbladder and Biliary: Unremarkable.   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   Spleen: No abnormality identified in the spleen.   Adrenals: No abnormality identified in either adrenal gland.   Urinary: Subcentimeter hypodensity in the lower pole right kidney, too small to characterize. No hydronephrosis.   Gastrointestinal/Peritoneum: No small or large bowel obstruction in the visualized abdomen. Small foci of free air in the upper abdomen. Sigmoid colon diverticulosis. Extensive perisigmoid fat stranding with small foci of free air adjacent to the sigmoid colon with confluence areas of stranding and small pockets of non rim enhancing fluid. Diffuse " colonic diverticulosis. Fluid in the right side of the colon. No evidence of acute appendicitis. Small bowel wall thickening in the perisigmoid region.   Vascular: Abdominal aorta is normal in caliber. Trace atherosclerosis.   Lymphatics: No enlarged lymph nodes by size criteria.   MSK/Body Wall: No aggressive bony lesion identified. Multilevel degenerative changes in the spine.       Perforated sigmoid diverticulitis with inflammatory changes in the perisigmoid region with small pockets of free fluid and pneumoperitoneum. No loculated abscess at this time.   Diffuse colonic diverticulosis.   Small bowel wall thickening in the perisigmoid region favored reactive.   Mary Chang sent epic message to  ARTURO WOODS on 9/3/2024 at 11:26 am.  (**-RCF-**) Findings:  See findings.     Signed by: Mary Chang 9/3/2024 11:41 AM Dictation workstation:   [id number]    Scheduled medications  allopurinol, 300 mg, oral, Daily  buPROPion XL, 300 mg, oral, Daily  enoxaparin, 40 mg, subcutaneous, q24h  metoprolol, 5 mg, intravenous, q6h  piperacillin-tazobactam, 3.375 g, intravenous, Once  piperacillin-tazobactam, 3.375 g, intravenous, q6h  pregabalin, 50 mg, oral, Daily  tamsulosin, 0.4 mg, oral, Daily  venlafaxine XR, 37.5 mg, oral, Daily      Continuous medications  dextrose 5%-0.45 % sodium chloride, 75 mL/hr, Last Rate: 75 mL/hr (09/04/24 0407)      PRN medications  PRN medications: acetaminophen **OR** acetaminophen **OR** acetaminophen, fluticasone, hydrALAZINE, morphine, morphine, oxygen, prochlorperazine **OR** prochlorperazine **OR** prochlorperazine  Results for orders placed or performed during the hospital encounter of 09/03/24 (from the past 24 hour(s))   CBC and Auto Differential   Result Value Ref Range    WBC 10.3 4.4 - 11.3 x10*3/uL    nRBC 0.0 0.0 - 0.0 /100 WBCs    RBC 4.54 4.50 - 5.90 x10*6/uL    Hemoglobin 14.3 13.5 - 17.5 g/dL    Hematocrit 42.2 41.0 - 52.0 %    MCV 93 80 - 100 fL    MCH  31.5 26.0 - 34.0 pg    MCHC 33.9 32.0 - 36.0 g/dL    RDW 14.5 11.5 - 14.5 %    Platelets 156 150 - 450 x10*3/uL    Neutrophils % 77.3 40.0 - 80.0 %    Immature Granulocytes %, Automated 0.4 0.0 - 0.9 %    Lymphocytes % 12.3 13.0 - 44.0 %    Monocytes % 9.6 2.0 - 10.0 %    Eosinophils % 0.2 0.0 - 6.0 %    Basophils % 0.2 0.0 - 2.0 %    Neutrophils Absolute 7.97 (H) 1.20 - 7.70 x10*3/uL    Immature Granulocytes Absolute, Automated 0.04 0.00 - 0.70 x10*3/uL    Lymphocytes Absolute 1.27 1.20 - 4.80 x10*3/uL    Monocytes Absolute 0.99 0.10 - 1.00 x10*3/uL    Eosinophils Absolute 0.02 0.00 - 0.70 x10*3/uL    Basophils Absolute 0.02 0.00 - 0.10 x10*3/uL   Magnesium   Result Value Ref Range    Magnesium 1.72 1.60 - 2.40 mg/dL   Comprehensive metabolic panel   Result Value Ref Range    Glucose 119 (H) 74 - 99 mg/dL    Sodium 137 136 - 145 mmol/L    Potassium 3.8 3.5 - 5.3 mmol/L    Chloride 102 98 - 107 mmol/L    Bicarbonate 24 21 - 32 mmol/L    Anion Gap 15 10 - 20 mmol/L    Urea Nitrogen 20 6 - 23 mg/dL    Creatinine 1.12 0.50 - 1.30 mg/dL    eGFR 74 >60 mL/min/1.73m*2    Calcium 9.6 8.6 - 10.3 mg/dL    Albumin 3.7 3.4 - 5.0 g/dL    Alkaline Phosphatase 41 33 - 136 U/L    Total Protein 6.5 6.4 - 8.2 g/dL    AST 31 9 - 39 U/L    Bilirubin, Total 1.1 0.0 - 1.2 mg/dL    ALT 70 (H) 10 - 52 U/L   Lactate   Result Value Ref Range    Lactate 1.3 0.4 - 2.0 mmol/L   Urinalysis with Reflex Culture and Microscopic   Result Value Ref Range    Color, Urine Colorless (N) Light-Yellow, Yellow, Dark-Yellow    Appearance, Urine Clear Clear    Specific Gravity, Urine 1.019 1.005 - 1.035    pH, Urine 6.0 5.0, 5.5, 6.0, 6.5, 7.0, 7.5, 8.0    Protein, Urine NEGATIVE NEGATIVE, 10 (TRACE), 20 (TRACE) mg/dL    Glucose, Urine Normal Normal mg/dL    Blood, Urine NEGATIVE NEGATIVE    Ketones, Urine NEGATIVE NEGATIVE mg/dL    Bilirubin, Urine NEGATIVE NEGATIVE    Urobilinogen, Urine Normal Normal mg/dL    Nitrite, Urine NEGATIVE NEGATIVE    Leukocyte  Esterase, Urine NEGATIVE NEGATIVE   Extra Urine Gray Tube   Result Value Ref Range    Extra Tube Hold for add-ons.    CBC   Result Value Ref Range    WBC 12.1 (H) 4.4 - 11.3 x10*3/uL    nRBC 0.0 0.0 - 0.0 /100 WBCs    RBC 4.55 4.50 - 5.90 x10*6/uL    Hemoglobin 14.3 13.5 - 17.5 g/dL    Hematocrit 42.3 41.0 - 52.0 %    MCV 93 80 - 100 fL    MCH 31.4 26.0 - 34.0 pg    MCHC 33.8 32.0 - 36.0 g/dL    RDW 14.6 (H) 11.5 - 14.5 %    Platelets 161 150 - 450 x10*3/uL   Comprehensive Metabolic Panel   Result Value Ref Range    Glucose 104 (H) 74 - 99 mg/dL    Sodium 135 (L) 136 - 145 mmol/L    Potassium 3.8 3.5 - 5.3 mmol/L    Chloride 100 98 - 107 mmol/L    Bicarbonate 24 21 - 32 mmol/L    Anion Gap 15 10 - 20 mmol/L    Urea Nitrogen 13 6 - 23 mg/dL    Creatinine 1.17 0.50 - 1.30 mg/dL    eGFR 70 >60 mL/min/1.73m*2    Calcium 8.8 8.6 - 10.3 mg/dL    Albumin 3.5 3.4 - 5.0 g/dL    Alkaline Phosphatase 43 33 - 136 U/L    Total Protein 6.5 6.4 - 8.2 g/dL    AST 22 9 - 39 U/L    Bilirubin, Total 2.1 (H) 0.0 - 1.2 mg/dL    ALT 54 (H) 10 - 52 U/L   Magnesium   Result Value Ref Range    Magnesium 1.75 1.60 - 2.40 mg/dL   Phosphorus   Result Value Ref Range    Phosphorus 4.0 2.5 - 4.9 mg/dL                            Assessment/Plan   Assessment & Plan  Acute diverticulitis    Diverticulitis of colon with perforation    Diverticulitis of colon with perforation  - reports dec in pain and feeling better from yesterday with initiation of IV ABX. Will cont to try to manage him medically.  - labs reviewed; lactate yesterday WNL; WBC wound elevated today from yesterday; no anemia; ALT improving; kidney function stable; magnesium and phosphorus WNL.  - reviewed imaging. CT abd pelvis 9/3/24 shows perforated sigmoid diverticulitis with inflammatory changes but not evidence of abscess at that time.   - cont IV ABX with zosyn q 6 per ID recommendation. Completed IV flagyl.   - NPO with sips for meds.  - D5 1/2 NS at 75 an hour.  - IS and enc to  use 10 x an hour.  - recommend CPAP use HS.  - lovenox for anticoagulation.  - SCDs while in bed.  - compazine PRN nausea.  - tylenol/morphine PRN pain.  - activity as tolerated.      - remainder of care per primary.          I spent 35 minutes in the professional and overall care of this patient.    Dr. Garcia updated on plan of care.     Evelia Currie, APRN-CNP

## 2024-09-04 NOTE — PROGRESS NOTES
"  Subjective    Patient reports left lower quadrant abdominal pain is still present but has improved.  He does report a little nausea today and denies vomiting, chest pain or shortness of breath.    Objective    Vitals  Visit Vitals  /74 (BP Location: Left arm, Patient Position: Lying)   Pulse 82   Temp 36.7 °C (98.1 °F) (Temporal)   Resp 18   Ht 1.778 m (5' 10\")   Wt 102 kg (225 lb)   SpO2 94%   BMI 32.28 kg/m²   Smoking Status Never   BSA 2.24 m²       Physical Exam   General: Patient is alert. NAD.  HEENT: Sclera clear.  CVS: RRR.  Lungs: CTAB.   Abdomen: Soft.  Left lower quadrant abdominal tenderness to palpation with no guarding.  +BS.     Extremities: No pitting edema bilat ankles.  Psychiatric: Cooperative.     IOs    Intake/Output Summary (Last 24 hours) at 9/4/2024 1725  Last data filed at 9/4/2024 1506  Gross per 24 hour   Intake 1575 ml   Output --   Net 1575 ml       Labs:   Results from last 72 hours   Lab Units 09/04/24  0655 09/03/24  1017   SODIUM mmol/L 135* 137   POTASSIUM mmol/L 3.8 3.8   CHLORIDE mmol/L 100 102   CO2 mmol/L 24 24   BUN mg/dL 13 20   CREATININE mg/dL 1.17 1.12   GLUCOSE mg/dL 104* 119*   CALCIUM mg/dL 8.8 9.6   ANION GAP mmol/L 15 15   EGFR mL/min/1.73m*2 70 74   PHOSPHORUS mg/dL 4.0  --       Results from last 72 hours   Lab Units 09/04/24  0655 09/03/24  1017   WBC AUTO x10*3/uL 12.1* 10.3   HEMOGLOBIN g/dL 14.3 14.3   HEMATOCRIT % 42.3 42.2   PLATELETS AUTO x10*3/uL 161 156   NEUTROS PCT AUTO %  --  77.3   LYMPHS PCT AUTO %  --  12.3   MONOS PCT AUTO %  --  9.6   EOS PCT AUTO %  --  0.2      Lab Results   Component Value Date    CALCIUM 8.8 09/04/2024    PHOS 4.0 09/04/2024      Lab Results   Component Value Date    CRP 0.22 10/03/2023      [unfilled]       Images  CT abdomen pelvis w IV contrast  Narrative: Interpreted By:  Mary Chang,   STUDY:  CT ABDOMEN PELVIS W IV CONTRAST;  9/3/2024 10:56 am      INDICATION:  65 y/o   M with  Signs/Symptoms:abd pain.    "   LIMITATIONS:  None.      ACCESSION NUMBER(S):  [id number]      ORDERING CLINICIAN:  ARTURO WOODS      TECHNIQUE:  After the administration of IV iodonated contrast, spiral axial  images were obtained from the xiphoid down through the symphysis  pubis. Sagittal and coronal reconstruction images were generated.  75 mL of Omnipaque 350.      COMPARISON:  06/22/2016.      FINDINGS:  Lower Chest: Streaky bibasilar atelectasis.      Liver: The liver is unremarkable without focal lesion.      Gallbladder and Biliary: Unremarkable.      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      Spleen: No abnormality identified in the spleen.      Adrenals: No abnormality identified in either adrenal gland.      Urinary: Subcentimeter hypodensity in the lower pole right kidney,  too small to characterize. No hydronephrosis.      Gastrointestinal/Peritoneum: No small or large bowel obstruction in  the visualized abdomen. Small foci of free air in the upper abdomen.  Sigmoid colon diverticulosis. Extensive perisigmoid fat stranding  with small foci of free air adjacent to the sigmoid colon with  confluence areas of stranding and small pockets of non rim enhancing  fluid. Diffuse colonic diverticulosis. Fluid in the right side of the  colon. No evidence of acute appendicitis. Small bowel wall thickening  in the perisigmoid region.      Vascular: Abdominal aorta is normal in caliber. Trace atherosclerosis.      Lymphatics: No enlarged lymph nodes by size criteria.      MSK/Body Wall: No aggressive bony lesion identified. Multilevel  degenerative changes in the spine.      Impression: Perforated sigmoid diverticulitis with inflammatory changes in the  perisigmoid region with small pockets of free fluid and  pneumoperitoneum. No loculated abscess at this time.      Diffuse colonic diverticulosis.      Small bowel wall thickening in the perisigmoid region favored  reactive.      Mary Chang sent epic  message to  ARTURO WOODS on  9/3/2024 at 11:26 am.  (**-RCF-**) Findings:  See findings.          Signed by: Mary Chang 9/3/2024 11:41 AM  Dictation workstation:   [id number]      Meds  Scheduled medications  allopurinol, 300 mg, oral, Daily  buPROPion XL, 300 mg, oral, Daily  enoxaparin, 40 mg, subcutaneous, q24h  metoprolol, 5 mg, intravenous, q6h  piperacillin-tazobactam, 3.375 g, intravenous, Once  piperacillin-tazobactam, 3.375 g, intravenous, q6h  pregabalin, 50 mg, oral, Daily  tamsulosin, 0.4 mg, oral, Daily  venlafaxine XR, 37.5 mg, oral, Daily      Continuous medications  dextrose 5%-0.45 % sodium chloride, 75 mL/hr, Last Rate: 75 mL/hr (09/04/24 0407)      PRN medications  PRN medications: acetaminophen **OR** acetaminophen **OR** acetaminophen, fluticasone, hydrALAZINE, morphine, morphine, oxygen, prochlorperazine **OR** prochlorperazine **OR** prochlorperazine     Assessment and Plan    Durga Romero is a 64 y.o. male with past medical history of sciatica, hypertension, who came to the hospital secondary to left-sided abdominal pain and found to have perforated sigmoid diverticulitis.     Perforated sigmoid diverticulitis  -General Surgery consulted and patient seen by Dr. Garcia in the ER, who recommends attempting medical management at this time, but if not successful patient will need ex lap.   -ID following and recommends continue Zosyn.  -Overall, patient reports some improvement of his abdominal pain today.  -Continue IV fluids, antiemetics as needed, pain medications as needed.  -Monitor.     Hypertension  -BP was elevated initially. This may be related to pain but also it appears patient is likely uncontrolled as an outpatient.  PCP had started patient on olmesartan on 8/30 in addition to his chronic med of carvedilol.  -As patient is n.p.o., will continue IV Lopressor 5 mg every 6 hours and can give IV hydralazine every 4 hours as needed.  -BP appears stable  today.  -Monitor.     Prostatitis  -Patient started on cephalexin on 8/30.  Urinalysis at the time was benign.  Patient on IV Zosyn for perforated sigmoid diverticulitis.  Urinalysis on admission also benign for infection.  -Continue home med of tamsulosin.  -Monitor.     Low back pain with radiculopathy  -Patient is followed by pain management and chronically on Lyrica.  PCP started patient on prednisone and added gabapentin on 8/30.  -Holding prednisone and gabapentin at this time especially as patient is NPO.  Continue Lyrica when patient taking orals.  -Follow-up with pain management and PCP.     DVT prophylaxis  -Lovenox subcu.

## 2024-09-04 NOTE — PROGRESS NOTES
Durga Romero is a 64 y.o. male on day 1 of admission presenting with Acute diverticulitis.    Subjective   Interval History: no fever, no new complaints        Review of Systems    Objective   Range of Vitals (last 24 hours)  Heart Rate:  []   Temp:  [35.6 °C (96.1 °F)-37.4 °C (99.3 °F)]   Resp:  [16-20]   BP: (116-151)/(74-90)   SpO2:  [85 %-96 %]   Daily Weight  09/03/24 : 102 kg (225 lb)    Body mass index is 32.28 kg/m².    Physical Exam  Constitutional:       Appearance: Normal appearance.   HENT:      Head: Normocephalic and atraumatic.      Mouth/Throat:      Mouth: Mucous membranes are moist.      Pharynx: Oropharynx is clear.   Eyes:      Pupils: Pupils are equal, round, and reactive to light.   Cardiovascular:      Rate and Rhythm: Normal rate and regular rhythm.      Heart sounds: Normal heart sounds.   Pulmonary:      Effort: Pulmonary effort is normal.      Breath sounds: Normal breath sounds.   Chest:      Chest wall: Tenderness: reviewed.   Abdominal:      General: Abdomen is flat. Bowel sounds are normal.      Palpations: Abdomen is soft.      Tenderness: There is abdominal tenderness.   Musculoskeletal:      Cervical back: Normal range of motion.   Neurological:      Mental Status: He is alert.         Antibiotics  cephalexin - 500 mg  piperacillin-tazobactam - 3.375 gram/50 mL, 3.375 gram/50 mL    Relevant Results  Labs  Results from last 72 hours   Lab Units 09/04/24  0655 09/03/24  1017   WBC AUTO x10*3/uL 12.1* 10.3   HEMOGLOBIN g/dL 14.3 14.3   HEMATOCRIT % 42.3 42.2   PLATELETS AUTO x10*3/uL 161 156   NEUTROS PCT AUTO %  --  77.3   LYMPHS PCT AUTO %  --  12.3   MONOS PCT AUTO %  --  9.6   EOS PCT AUTO %  --  0.2     Results from last 72 hours   Lab Units 09/04/24  0655 09/03/24  1017   SODIUM mmol/L 135* 137   POTASSIUM mmol/L 3.8 3.8   CHLORIDE mmol/L 100 102   CO2 mmol/L 24 24   BUN mg/dL 13 20   CREATININE mg/dL 1.17 1.12   GLUCOSE mg/dL 104* 119*   CALCIUM mg/dL 8.8 9.6   ANION  GAP mmol/L 15 15   EGFR mL/min/1.73m*2 70 74   PHOSPHORUS mg/dL 4.0  --      Results from last 72 hours   Lab Units 09/04/24  0655 09/03/24  1017   ALK PHOS U/L 43 41   BILIRUBIN TOTAL mg/dL 2.1* 1.1   PROTEIN TOTAL g/dL 6.5 6.5   ALT U/L 54* 70*   AST U/L 22 31   ALBUMIN g/dL 3.5 3.7     Estimated Creatinine Clearance: 78.3 mL/min (by C-G formula based on SCr of 1.17 mg/dL).  C-Reactive Protein   Date Value Ref Range Status   10/03/2023 0.22 <1.00 mg/dL Final     Microbiology  Reviewed  Imaging   Reviewed       Assessment/Plan     Perforated sigmoid diverticulitis     Recommendations :  Vikki Carr I spent minutes in the professional and overall care of this patient.      Jose Alfredo Mcmillan MD

## 2024-09-05 ENCOUNTER — APPOINTMENT (OUTPATIENT)
Dept: RADIOLOGY | Facility: HOSPITAL | Age: 64
End: 2024-09-05
Payer: COMMERCIAL

## 2024-09-05 LAB
ALBUMIN SERPL BCP-MCNC: 2.9 G/DL (ref 3.4–5)
ALP SERPL-CCNC: 38 U/L (ref 33–136)
ALT SERPL W P-5'-P-CCNC: 36 U/L (ref 10–52)
ANION GAP SERPL CALC-SCNC: 10 MMOL/L (ref 10–20)
AST SERPL W P-5'-P-CCNC: 17 U/L (ref 9–39)
BILIRUB SERPL-MCNC: 1 MG/DL (ref 0–1.2)
BUN SERPL-MCNC: 13 MG/DL (ref 6–23)
CALCIUM SERPL-MCNC: 8.3 MG/DL (ref 8.6–10.3)
CHLORIDE SERPL-SCNC: 100 MMOL/L (ref 98–107)
CO2 SERPL-SCNC: 28 MMOL/L (ref 21–32)
CREAT SERPL-MCNC: 1.02 MG/DL (ref 0.5–1.3)
EGFRCR SERPLBLD CKD-EPI 2021: 83 ML/MIN/1.73M*2
ERYTHROCYTE [DISTWIDTH] IN BLOOD BY AUTOMATED COUNT: 14.3 % (ref 11.5–14.5)
GLUCOSE SERPL-MCNC: 116 MG/DL (ref 74–99)
HCT VFR BLD AUTO: 37.3 % (ref 41–52)
HGB BLD-MCNC: 12.3 G/DL (ref 13.5–17.5)
MAGNESIUM SERPL-MCNC: 2.05 MG/DL (ref 1.6–2.4)
MCH RBC QN AUTO: 31 PG (ref 26–34)
MCHC RBC AUTO-ENTMCNC: 33 G/DL (ref 32–36)
MCV RBC AUTO: 94 FL (ref 80–100)
NRBC BLD-RTO: 0 /100 WBCS (ref 0–0)
PHOSPHATE SERPL-MCNC: 3 MG/DL (ref 2.5–4.9)
PLATELET # BLD AUTO: 148 X10*3/UL (ref 150–450)
POTASSIUM SERPL-SCNC: 3.8 MMOL/L (ref 3.5–5.3)
PROT SERPL-MCNC: 5.7 G/DL (ref 6.4–8.2)
RBC # BLD AUTO: 3.97 X10*6/UL (ref 4.5–5.9)
SODIUM SERPL-SCNC: 134 MMOL/L (ref 136–145)
WBC # BLD AUTO: 8.7 X10*3/UL (ref 4.4–11.3)

## 2024-09-05 PROCEDURE — 99232 SBSQ HOSP IP/OBS MODERATE 35: CPT | Performed by: NURSE PRACTITIONER

## 2024-09-05 PROCEDURE — 2550000001 HC RX 255 CONTRASTS: Performed by: INTERNAL MEDICINE

## 2024-09-05 PROCEDURE — 2500000004 HC RX 250 GENERAL PHARMACY W/ HCPCS (ALT 636 FOR OP/ED): Performed by: INTERNAL MEDICINE

## 2024-09-05 PROCEDURE — 74177 CT ABD & PELVIS W/CONTRAST: CPT | Performed by: RADIOLOGY

## 2024-09-05 PROCEDURE — A9698 NON-RAD CONTRAST MATERIALNOC: HCPCS | Performed by: INTERNAL MEDICINE

## 2024-09-05 PROCEDURE — 84100 ASSAY OF PHOSPHORUS: CPT | Performed by: INTERNAL MEDICINE

## 2024-09-05 PROCEDURE — 74177 CT ABD & PELVIS W/CONTRAST: CPT

## 2024-09-05 PROCEDURE — 2500000005 HC RX 250 GENERAL PHARMACY W/O HCPCS: Performed by: INTERNAL MEDICINE

## 2024-09-05 PROCEDURE — 36415 COLL VENOUS BLD VENIPUNCTURE: CPT | Performed by: INTERNAL MEDICINE

## 2024-09-05 PROCEDURE — 99232 SBSQ HOSP IP/OBS MODERATE 35: CPT | Performed by: INTERNAL MEDICINE

## 2024-09-05 PROCEDURE — 2500000004 HC RX 250 GENERAL PHARMACY W/ HCPCS (ALT 636 FOR OP/ED): Performed by: NURSE PRACTITIONER

## 2024-09-05 PROCEDURE — 83735 ASSAY OF MAGNESIUM: CPT | Performed by: INTERNAL MEDICINE

## 2024-09-05 PROCEDURE — 85027 COMPLETE CBC AUTOMATED: CPT | Performed by: INTERNAL MEDICINE

## 2024-09-05 PROCEDURE — 84075 ASSAY ALKALINE PHOSPHATASE: CPT | Performed by: INTERNAL MEDICINE

## 2024-09-05 PROCEDURE — 94660 CPAP INITIATION&MGMT: CPT

## 2024-09-05 PROCEDURE — 1210000001 HC SEMI-PRIVATE ROOM DAILY

## 2024-09-05 RX ORDER — PANTOPRAZOLE SODIUM 40 MG/10ML
40 INJECTION, POWDER, LYOPHILIZED, FOR SOLUTION INTRAVENOUS
Status: DISCONTINUED | OUTPATIENT
Start: 2024-09-05 | End: 2024-09-07 | Stop reason: HOSPADM

## 2024-09-05 ASSESSMENT — COGNITIVE AND FUNCTIONAL STATUS - GENERAL
DAILY ACTIVITIY SCORE: 24
DAILY ACTIVITIY SCORE: 24
MOBILITY SCORE: 24
MOBILITY SCORE: 24

## 2024-09-05 ASSESSMENT — PAIN - FUNCTIONAL ASSESSMENT
PAIN_FUNCTIONAL_ASSESSMENT: 0-10

## 2024-09-05 ASSESSMENT — PAIN DESCRIPTION - LOCATION
LOCATION: ABDOMEN

## 2024-09-05 ASSESSMENT — PAIN SCALES - GENERAL
PAINLEVEL_OUTOF10: 3
PAINLEVEL_OUTOF10: 7
PAINLEVEL_OUTOF10: 6
PAINLEVEL_OUTOF10: 3
PAINLEVEL_OUTOF10: 0 - NO PAIN

## 2024-09-05 NOTE — PROGRESS NOTES
"  Subjective    Patient reports some improvement of his abdominal pain but did receive morphine this morning.  He denies nausea, vomiting, diarrhea, chest pain or shortness of breath.    Objective    Vitals  Visit Vitals  /76   Pulse 74   Temp 36.8 °C (98.2 °F) (Temporal)   Resp 18   Ht 1.778 m (5' 10\")   Wt 102 kg (225 lb)   SpO2 92%   BMI 32.28 kg/m²   Smoking Status Never   BSA 2.24 m²       Physical Exam   General: Patient is alert.  No acute distress.  HEENT: Sclera clear.  CVS: RRR.  Lungs: CTAB.   Abdomen: Soft.  Mild left lower quadrant abdominal tenderness to palpation with no guarding.  Bowel sounds present.    Extremities: No pitting edema bilateral ankles.  Psychiatric: Cooperative.     IOs    Intake/Output Summary (Last 24 hours) at 9/5/2024 1214  Last data filed at 9/5/2024 0321  Gross per 24 hour   Intake 1741.25 ml   Output --   Net 1741.25 ml       Labs:   Results from last 72 hours   Lab Units 09/05/24 0621 09/04/24  0655 09/03/24  1017   SODIUM mmol/L 134* 135* 137   POTASSIUM mmol/L 3.8 3.8 3.8   CHLORIDE mmol/L 100 100 102   CO2 mmol/L 28 24 24   BUN mg/dL 13 13 20   CREATININE mg/dL 1.02 1.17 1.12   GLUCOSE mg/dL 116* 104* 119*   CALCIUM mg/dL 8.3* 8.8 9.6   ANION GAP mmol/L 10 15 15   EGFR mL/min/1.73m*2 83 70 74   PHOSPHORUS mg/dL 3.0 4.0  --       Results from last 72 hours   Lab Units 09/05/24  0621 09/04/24  0655 09/03/24  1017   WBC AUTO x10*3/uL 8.7 12.1* 10.3   HEMOGLOBIN g/dL 12.3* 14.3 14.3   HEMATOCRIT % 37.3* 42.3 42.2   PLATELETS AUTO x10*3/uL 148* 161 156   NEUTROS PCT AUTO %  --   --  77.3   LYMPHS PCT AUTO %  --   --  12.3   MONOS PCT AUTO %  --   --  9.6   EOS PCT AUTO %  --   --  0.2      Lab Results   Component Value Date    CALCIUM 8.3 (L) 09/05/2024    PHOS 3.0 09/05/2024      Lab Results   Component Value Date    CRP 0.22 10/03/2023      [unfilled]       Images  CT abdomen pelvis w IV and oral contrast  Narrative: Interpreted By:  Kike Bazan,   STUDY:  CT " ABDOMEN PELVIS W IV AND ORAL CONTRAST;  9/5/2024 9:37 am      INDICATION:  Signs/Symptoms:diverticulitis with perforation, reevaluate, looking  for development of abscess.          COMPARISON:  09/03/2024 the/pelvis CT      ACCESSION NUMBER(S):  [id number]      ORDERING CLINICIAN:  CAMILA HOUGH      TECHNIQUE:  CT of the abdomen was performed.  Contiguous axial images were  obtained at 3 mm slice thickness through the abdomen. Coronal and  sagittal reconstructions at 3 mm slice thickness were performed. 75  ML of Omnipaque 350 was administered intravenously without immediate  complication.      FINDINGS:  LOWER CHEST:  The lower lobes have the follow up small linear areas of atelectasis.      ABDOMEN:      LIVER:  Normal.      BILE DUCTS:  Normal.      GALLBLADDER:  Normal.      PANCREAS:  Normal.      SPLEEN:  Normal.      ADRENAL GLANDS:  Normal.      KIDNEYS AND URETERS:  The mid left kidney has a 3 mm nonobstructive stone, unchanged. The  inferior pole of the right kidney has a less than 1 cm lucency,  unchanged. No hydronephrosis or hydroureter is noted. The bladder is  normal.      BOWEL:  The proximal to mid sigmoid colon has numerous diverticula, bowel  wall thickening and edema/inflammation in the pericolonic fat. The  pericolonic fat has a few bubbles of free air and pockets of fluid  approximately 1 cm or less similar to the prior CT. No new fluid  collection is identified. The amount of free air along the  anterior/dome of the liver has significantly decreased. The  inflammation within the fat extends inferiorly to the left inguinal  region, unchanged.      The caliber of the remaining bowel is normal. Multiple diverticula  are also noted along the splenic flexure and descending colon,  unchanged. The 2/3 portion of the duodenum has a 1-2 cm diverticulum,  unchanged.      VESSELS:  The aorta has a few scattered atherosclerotic calcifications      PERITONEUM/RETROPERITONEUM/LYMPH NODES:  No ascites  is noted. No retroperitoneal or pelvic adenopathy is  noted. Delete      ABDOMINAL WALL:  No hernia noted.      BONE AND SOFT TISSUE:  The L5-S1 disc has vacuum phenomena near complete loss of height,  unchanged.      Impression: 1. The overall inflammatory changes about the sigmoid colon are  similar to the prior CT with a few bubbles of air in the few small  collections of fluid within the pericolonic fat.      2. The amount of free intraperitoneal air has decreased.          MACRO:  None      Signed by: Kike Bazan 9/5/2024 11:13 AM  Dictation workstation:   [id number]      Meds  Scheduled medications  allopurinol, 300 mg, oral, Daily  buPROPion XL, 300 mg, oral, Daily  enoxaparin, 40 mg, subcutaneous, q24h  metoprolol, 5 mg, intravenous, q6h  pantoprazole, 40 mg, intravenous, Daily before breakfast  piperacillin-tazobactam, 3.375 g, intravenous, Once  piperacillin-tazobactam, 3.375 g, intravenous, q6h  pregabalin, 50 mg, oral, Daily  tamsulosin, 0.4 mg, oral, Daily  venlafaxine XR, 37.5 mg, oral, Daily      Continuous medications  dextrose 5%-0.45 % sodium chloride, 75 mL/hr, Last Rate: 75 mL/hr (09/04/24 1840)      PRN medications  PRN medications: acetaminophen **OR** acetaminophen **OR** acetaminophen, fluticasone, hydrALAZINE, morphine, morphine, oxygen, prochlorperazine **OR** prochlorperazine **OR** prochlorperazine     Assessment and Plan    Durga Romero is a 64 y.o. male with past medical history of sciatica, hypertension, who came to the hospital secondary to left-sided abdominal pain and found to have perforated sigmoid diverticulitis.     Perforated sigmoid diverticulitis  -General Surgery consulted and patient seen by Dr. Garcia in the ER, who recommends attempting medical management at this time, but if not successful patient will need ex lap.   -ID following and recommends to continue Zosyn.  -General Surgery recommends repeat CT abdomen and pelvis with oral and IV contrast to reevaluate for  development of abscess.  -Patient is n.p.o. except for sips with meds and may have popsicle every shift per surgery's recommendation.  -Continue IV fluids, antiemetics as needed, pain medications as needed.  -Monitor.     Hypertension  -BP was elevated initially. This may be related to pain but also it appears patient is likely uncontrolled as an outpatient.  PCP had started patient on olmesartan on 8/30 in addition to his chronic med of carvedilol.  -As patient is n.p.o., will continue IV Lopressor 5 mg every 6 hours and can give IV hydralazine every 4 hours as needed.  -BP appears stable again today.  -Monitor.     Prostatitis  -Patient started on cephalexin on 8/30.  Urinalysis at the time was benign.  Patient on IV Zosyn for perforated sigmoid diverticulitis.  Urinalysis on admission also benign for infection.  -Continue home med of tamsulosin.  -Monitor.     Low back pain with radiculopathy  -Patient is followed by pain management and chronically on Lyrica.  PCP started patient on prednisone and added gabapentin on 8/30.  -Holding prednisone and gabapentin at this time especially as patient is NPO.  Continue Lyrica.  -Follow-up with pain management and PCP.     DVT prophylaxis  -Lovenox subcu.

## 2024-09-05 NOTE — PROGRESS NOTES
Durga Romero is a 64 y.o. male on day 2 of admission presenting with Acute diverticulitis.    Subjective   Interval History: no fever, no new complaints , less pain       Review of Systems    Objective   Range of Vitals (last 24 hours)  Heart Rate:  [74-85]   Temp:  [36.7 °C (98.1 °F)-36.9 °C (98.4 °F)]   Resp:  [18]   BP: (109-135)/(73-83)   SpO2:  [92 %-94 %]   Daily Weight  09/03/24 : 102 kg (225 lb)    Body mass index is 32.28 kg/m².    Physical Exam  Constitutional:       Appearance: Normal appearance.   HENT:      Head: Normocephalic and atraumatic.      Mouth/Throat:      Mouth: Mucous membranes are moist.      Pharynx: Oropharynx is clear.   Eyes:      Pupils: Pupils are equal, round, and reactive to light.   Cardiovascular:      Rate and Rhythm: Normal rate and regular rhythm.      Heart sounds: Normal heart sounds.   Pulmonary:      Effort: Pulmonary effort is normal.      Breath sounds: Normal breath sounds.   Chest:      Chest wall: Tenderness: reviewed.   Abdominal:      General: Abdomen is flat. Bowel sounds are normal.      Palpations: Abdomen is soft.      Tenderness: There is abdominal tenderness.   Musculoskeletal:      Cervical back: Normal range of motion.   Neurological:      Mental Status: He is alert.         Antibiotics  cephalexin - 500 mg  piperacillin-tazobactam - 3.375 gram/50 mL, 3.375 gram/50 mL    Relevant Results  Labs  Results from last 72 hours   Lab Units 09/05/24  0621 09/04/24  0655 09/03/24  1017   WBC AUTO x10*3/uL 8.7 12.1* 10.3   HEMOGLOBIN g/dL 12.3* 14.3 14.3   HEMATOCRIT % 37.3* 42.3 42.2   PLATELETS AUTO x10*3/uL 148* 161 156   NEUTROS PCT AUTO %  --   --  77.3   LYMPHS PCT AUTO %  --   --  12.3   MONOS PCT AUTO %  --   --  9.6   EOS PCT AUTO %  --   --  0.2     Results from last 72 hours   Lab Units 09/05/24  0621 09/04/24  0655 09/03/24  1017   SODIUM mmol/L 134* 135* 137   POTASSIUM mmol/L 3.8 3.8 3.8   CHLORIDE mmol/L 100 100 102   CO2 mmol/L 28 24 24   BUN mg/dL  13 13 20   CREATININE mg/dL 1.02 1.17 1.12   GLUCOSE mg/dL 116* 104* 119*   CALCIUM mg/dL 8.3* 8.8 9.6   ANION GAP mmol/L 10 15 15   EGFR mL/min/1.73m*2 83 70 74   PHOSPHORUS mg/dL 3.0 4.0  --      Results from last 72 hours   Lab Units 09/05/24  0621 09/04/24  0655 09/03/24  1017   ALK PHOS U/L 38 43 41   BILIRUBIN TOTAL mg/dL 1.0 2.1* 1.1   PROTEIN TOTAL g/dL 5.7* 6.5 6.5   ALT U/L 36 54* 70*   AST U/L 17 22 31   ALBUMIN g/dL 2.9* 3.5 3.7     Estimated Creatinine Clearance: 89.9 mL/min (by C-G formula based on SCr of 1.02 mg/dL).  C-Reactive Protein   Date Value Ref Range Status   10/03/2023 0.22 <1.00 mg/dL Final     Microbiology  Reviewed  Imaging   Reviewed       Assessment/Plan     Perforated sigmoid diverticulitis, the repeat CT similar findings     Recommendations :  Vikki Carr I spent minutes in the professional and overall care of this patient.      Jose Alfredo Mcmillan MD

## 2024-09-05 NOTE — DISCHARGE INSTRUCTIONS
Please follow up with Dr. Garcia in the out patient clinic   [id number] [id number] [phone]     It is recommended that you take a fiber supplement daily. This can be in powder or pill form. Powder is less expensive. 2 examples of fiber powder brands are Metamucil and Benefiber.   Please be sure to hydrate well.

## 2024-09-05 NOTE — PROGRESS NOTES
Durga Romero is a 64 y.o. male on day 2 of admission presenting with Acute diverticulitis.    Subjective   No acute overnight events.  Pt reports he feels better than yesterday and feels overall about 50% better since coming to hospital.   Abd pain in LLQ remains present but less overall. Was able to sleep well last night and used CPAP without difficulties.   No n/v.   Passing more gas but no BM.  Urinating without diff.   No CP, SOB, dizziness. No fever, chills, sweats.        Objective     Physical Exam  Vitals reviewed.   Constitutional:       General: He is not in acute distress.     Appearance: Normal appearance. He is obese. He is not ill-appearing, toxic-appearing or diaphoretic.   HENT:      Head: Normocephalic and atraumatic.      Mouth/Throat:      Mouth: Mucous membranes are moist.   Eyes:      General: No scleral icterus.        Right eye: No discharge.         Left eye: No discharge.      Conjunctiva/sclera: Conjunctivae normal.   Cardiovascular:      Rate and Rhythm: Normal rate and regular rhythm.      Pulses: Normal pulses.      Heart sounds: Normal heart sounds. No murmur heard.     No friction rub. No gallop.   Pulmonary:      Effort: Pulmonary effort is normal. No respiratory distress.      Breath sounds: Normal breath sounds. No stridor. No wheezing, rhonchi or rales.   Chest:      Chest wall: No tenderness.   Abdominal:      Tenderness: There is abdominal tenderness. There is guarding.      Comments: Normal BS x 4 q. Abd soft on right side but more firm in LLQ. Tender to LUQ and LLQ but less tender than yesterday.   Musculoskeletal:      Right lower leg: Edema present.      Left lower leg: Edema present.      Comments: + 1 pitting edema marco a lower ext.   Skin:     General: Skin is warm and dry.   Neurological:      Mental Status: He is alert and oriented to person, place, and time.      Gait: Gait normal.   Psychiatric:         Mood and Affect: Mood normal.         Behavior: Behavior normal.   "       Thought Content: Thought content normal.         Judgment: Judgment normal.         Last Recorded Vitals  Blood pressure 135/83, pulse 78, temperature 36.8 °C (98.2 °F), temperature source Temporal, resp. rate 18, height 1.778 m (5' 10\"), weight 102 kg (225 lb), SpO2 92%.  At time of exam HR 75 and pulse ox 96%    Intake/Output last 3 Shifts:  I/O last 3 completed shifts:  In: 2766.3 (27.1 mL/kg) [I.V.:1966.3 (19.3 mL/kg); IV Piggyback:800]  Out: - (0 mL/kg)   Weight: 102.1 kg     Relevant Results    CT abdomen pelvis w IV contrast    Result Date: 9/3/2024  Interpreted By:  Mary Chang, STUDY: CT ABDOMEN PELVIS W IV CONTRAST;  9/3/2024 10:56 am   INDICATION: 65 y/o   M with  Signs/Symptoms:abd pain.   LIMITATIONS: None.   ACCESSION NUMBER(S): [id number]   ORDERING CLINICIAN: ARTURO WOODS   TECHNIQUE: After the administration of IV iodonated contrast, spiral axial images were obtained from the xiphoid down through the symphysis pubis. Sagittal and coronal reconstruction images were generated. 75 mL of Omnipaque 350.   COMPARISON: 06/22/2016.   FINDINGS: Lower Chest: Streaky bibasilar atelectasis.   Liver: The liver is unremarkable without focal lesion.   Gallbladder and Biliary: Unremarkable.   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   Spleen: No abnormality identified in the spleen.   Adrenals: No abnormality identified in either adrenal gland.   Urinary: Subcentimeter hypodensity in the lower pole right kidney, too small to characterize. No hydronephrosis.   Gastrointestinal/Peritoneum: No small or large bowel obstruction in the visualized abdomen. Small foci of free air in the upper abdomen. Sigmoid colon diverticulosis. Extensive perisigmoid fat stranding with small foci of free air adjacent to the sigmoid colon with confluence areas of stranding and small pockets of non rim enhancing fluid. Diffuse colonic diverticulosis. Fluid in the right side of the colon. No " evidence of acute appendicitis. Small bowel wall thickening in the perisigmoid region.   Vascular: Abdominal aorta is normal in caliber. Trace atherosclerosis.   Lymphatics: No enlarged lymph nodes by size criteria.   MSK/Body Wall: No aggressive bony lesion identified. Multilevel degenerative changes in the spine.       Perforated sigmoid diverticulitis with inflammatory changes in the perisigmoid region with small pockets of free fluid and pneumoperitoneum. No loculated abscess at this time.   Diffuse colonic diverticulosis.   Small bowel wall thickening in the perisigmoid region favored reactive.   Mary Chang sent epic message to  ARTURO WOODS on 9/3/2024 at 11:26 am.  (**-RCF-**) Findings:  See findings.     Signed by: Mary Chang 9/3/2024 11:41 AM Dictation workstation:   [id number]    Scheduled medications  allopurinol, 300 mg, oral, Daily  buPROPion XL, 300 mg, oral, Daily  enoxaparin, 40 mg, subcutaneous, q24h  metoprolol, 5 mg, intravenous, q6h  piperacillin-tazobactam, 3.375 g, intravenous, Once  piperacillin-tazobactam, 3.375 g, intravenous, q6h  pregabalin, 50 mg, oral, Daily  tamsulosin, 0.4 mg, oral, Daily  venlafaxine XR, 37.5 mg, oral, Daily      Continuous medications  dextrose 5%-0.45 % sodium chloride, 75 mL/hr, Last Rate: 75 mL/hr (09/04/24 1840)      PRN medications  PRN medications: acetaminophen **OR** acetaminophen **OR** acetaminophen, fluticasone, hydrALAZINE, morphine, morphine, oxygen, prochlorperazine **OR** prochlorperazine **OR** prochlorperazine  Results for orders placed or performed during the hospital encounter of 09/03/24 (from the past 24 hour(s))   CBC   Result Value Ref Range    WBC 8.7 4.4 - 11.3 x10*3/uL    nRBC 0.0 0.0 - 0.0 /100 WBCs    RBC 3.97 (L) 4.50 - 5.90 x10*6/uL    Hemoglobin 12.3 (L) 13.5 - 17.5 g/dL    Hematocrit 37.3 (L) 41.0 - 52.0 %    MCV 94 80 - 100 fL    MCH 31.0 26.0 - 34.0 pg    MCHC 33.0 32.0 - 36.0 g/dL    RDW 14.3 11.5 - 14.5 %     Platelets 148 (L) 150 - 450 x10*3/uL   Comprehensive Metabolic Panel   Result Value Ref Range    Glucose 116 (H) 74 - 99 mg/dL    Sodium 134 (L) 136 - 145 mmol/L    Potassium 3.8 3.5 - 5.3 mmol/L    Chloride 100 98 - 107 mmol/L    Bicarbonate 28 21 - 32 mmol/L    Anion Gap 10 10 - 20 mmol/L    Urea Nitrogen 13 6 - 23 mg/dL    Creatinine 1.02 0.50 - 1.30 mg/dL    eGFR 83 >60 mL/min/1.73m*2    Calcium 8.3 (L) 8.6 - 10.3 mg/dL    Albumin 2.9 (L) 3.4 - 5.0 g/dL    Alkaline Phosphatase 38 33 - 136 U/L    Total Protein 5.7 (L) 6.4 - 8.2 g/dL    AST 17 9 - 39 U/L    Bilirubin, Total 1.0 0.0 - 1.2 mg/dL    ALT 36 10 - 52 U/L   Magnesium   Result Value Ref Range    Magnesium 2.05 1.60 - 2.40 mg/dL   Phosphorus   Result Value Ref Range    Phosphorus 3.0 2.5 - 4.9 mg/dL                            Assessment/Plan   Assessment & Plan  Acute diverticulitis    Diverticulitis of colon with perforation    Diverticulitis with perforation  - reviewed am labs. ALT and bilirubin normalized now. WBC normalized now. Anemia new this am. Will cont to monitor labs.  - repeat CT abd pelvis with oral and IV contrast to re evaluate for development of abscess.   - cont IV ABX with zosyn q 6 per ID recommendation. Completed IV flagyl.   - NPO with sips for meds and may have popcicle q shift.  - D5 1/2 NS at 75 an hour.  - IS and enc to use 10 x an hour.  - recommend CPAP use HS.  - lovenox for anticoagulation.  - SCDs while in bed.  - compazine PRN nausea.  - tylenol/morphine PRN pain.  - activity as tolerated.        - remainder of care per primary.          I spent 35 minutes in the professional and overall care of this patient.    Dr. Garcia in to see patient and updated on plan of care.     Evelia Currie, APRN-CNP

## 2024-09-06 ENCOUNTER — APPOINTMENT (OUTPATIENT)
Dept: RADIOLOGY | Facility: HOSPITAL | Age: 64
End: 2024-09-06
Payer: COMMERCIAL

## 2024-09-06 LAB
ALBUMIN SERPL BCP-MCNC: 3 G/DL (ref 3.4–5)
ALP SERPL-CCNC: 46 U/L (ref 33–136)
ALT SERPL W P-5'-P-CCNC: 37 U/L (ref 10–52)
ANION GAP SERPL CALC-SCNC: 14 MMOL/L (ref 10–20)
AST SERPL W P-5'-P-CCNC: 23 U/L (ref 9–39)
BILIRUB SERPL-MCNC: 1 MG/DL (ref 0–1.2)
BUN SERPL-MCNC: 9 MG/DL (ref 6–23)
CALCIUM SERPL-MCNC: 8.1 MG/DL (ref 8.6–10.3)
CHLORIDE SERPL-SCNC: 104 MMOL/L (ref 98–107)
CO2 SERPL-SCNC: 24 MMOL/L (ref 21–32)
CREAT SERPL-MCNC: 0.89 MG/DL (ref 0.5–1.3)
EGFRCR SERPLBLD CKD-EPI 2021: >90 ML/MIN/1.73M*2
ERYTHROCYTE [DISTWIDTH] IN BLOOD BY AUTOMATED COUNT: 14 % (ref 11.5–14.5)
GLUCOSE SERPL-MCNC: 116 MG/DL (ref 74–99)
HCT VFR BLD AUTO: 36.8 % (ref 41–52)
HGB BLD-MCNC: 12.4 G/DL (ref 13.5–17.5)
MAGNESIUM SERPL-MCNC: 2.11 MG/DL (ref 1.6–2.4)
MCH RBC QN AUTO: 31.2 PG (ref 26–34)
MCHC RBC AUTO-ENTMCNC: 33.7 G/DL (ref 32–36)
MCV RBC AUTO: 93 FL (ref 80–100)
NRBC BLD-RTO: 0 /100 WBCS (ref 0–0)
PHOSPHATE SERPL-MCNC: 2.7 MG/DL (ref 2.5–4.9)
PLATELET # BLD AUTO: 182 X10*3/UL (ref 150–450)
POTASSIUM SERPL-SCNC: 3.6 MMOL/L (ref 3.5–5.3)
PROT SERPL-MCNC: 5.8 G/DL (ref 6.4–8.2)
RBC # BLD AUTO: 3.98 X10*6/UL (ref 4.5–5.9)
SODIUM SERPL-SCNC: 138 MMOL/L (ref 136–145)
WBC # BLD AUTO: 7.6 X10*3/UL (ref 4.4–11.3)

## 2024-09-06 PROCEDURE — 2500000004 HC RX 250 GENERAL PHARMACY W/ HCPCS (ALT 636 FOR OP/ED): Performed by: NURSE PRACTITIONER

## 2024-09-06 PROCEDURE — 36415 COLL VENOUS BLD VENIPUNCTURE: CPT | Performed by: INTERNAL MEDICINE

## 2024-09-06 PROCEDURE — 94760 N-INVAS EAR/PLS OXIMETRY 1: CPT

## 2024-09-06 PROCEDURE — 2500000002 HC RX 250 W HCPCS SELF ADMINISTERED DRUGS (ALT 637 FOR MEDICARE OP, ALT 636 FOR OP/ED): Performed by: INTERNAL MEDICINE

## 2024-09-06 PROCEDURE — 2500000001 HC RX 250 WO HCPCS SELF ADMINISTERED DRUGS (ALT 637 FOR MEDICARE OP): Performed by: INTERNAL MEDICINE

## 2024-09-06 PROCEDURE — 80069 RENAL FUNCTION PANEL: CPT | Performed by: INTERNAL MEDICINE

## 2024-09-06 PROCEDURE — 93971 EXTREMITY STUDY: CPT | Performed by: STUDENT IN AN ORGANIZED HEALTH CARE EDUCATION/TRAINING PROGRAM

## 2024-09-06 PROCEDURE — 2500000005 HC RX 250 GENERAL PHARMACY W/O HCPCS: Performed by: INTERNAL MEDICINE

## 2024-09-06 PROCEDURE — 99232 SBSQ HOSP IP/OBS MODERATE 35: CPT | Performed by: NURSE PRACTITIONER

## 2024-09-06 PROCEDURE — 84100 ASSAY OF PHOSPHORUS: CPT | Performed by: INTERNAL MEDICINE

## 2024-09-06 PROCEDURE — 85027 COMPLETE CBC AUTOMATED: CPT | Performed by: INTERNAL MEDICINE

## 2024-09-06 PROCEDURE — 83735 ASSAY OF MAGNESIUM: CPT | Performed by: INTERNAL MEDICINE

## 2024-09-06 PROCEDURE — 99232 SBSQ HOSP IP/OBS MODERATE 35: CPT | Performed by: INTERNAL MEDICINE

## 2024-09-06 PROCEDURE — 2500000004 HC RX 250 GENERAL PHARMACY W/ HCPCS (ALT 636 FOR OP/ED): Performed by: INTERNAL MEDICINE

## 2024-09-06 PROCEDURE — 1210000001 HC SEMI-PRIVATE ROOM DAILY

## 2024-09-06 PROCEDURE — 93971 EXTREMITY STUDY: CPT

## 2024-09-06 RX ORDER — CARVEDILOL 12.5 MG/1
12.5 TABLET ORAL 2 TIMES DAILY
Status: DISCONTINUED | OUTPATIENT
Start: 2024-09-06 | End: 2024-09-07 | Stop reason: HOSPADM

## 2024-09-06 ASSESSMENT — COGNITIVE AND FUNCTIONAL STATUS - GENERAL
DAILY ACTIVITIY SCORE: 24
MOBILITY SCORE: 24

## 2024-09-06 ASSESSMENT — PAIN SCALES - GENERAL
PAINLEVEL_OUTOF10: 6
PAINLEVEL_OUTOF10: 0 - NO PAIN
PAINLEVEL_OUTOF10: 0 - NO PAIN

## 2024-09-06 ASSESSMENT — PAIN - FUNCTIONAL ASSESSMENT
PAIN_FUNCTIONAL_ASSESSMENT: 0-10

## 2024-09-06 ASSESSMENT — ACTIVITIES OF DAILY LIVING (ADL): LACK_OF_TRANSPORTATION: NO

## 2024-09-06 NOTE — PROGRESS NOTES
"  Subjective    Patient had some nausea yesterday but he denies nausea today after eating breakfast.  He states less abdominal pain overall.  Patient denies chest pain, shortness of breath and states he has not had a bowel movement over the past 24 hours.    Objective    Vitals  Visit Vitals  BP (!) 163/100   Pulse 70   Temp 36.8 °C (98.2 °F) (Temporal)   Resp 16   Ht 1.778 m (5' 10\")   Wt 102 kg (225 lb)   SpO2 96%   BMI 32.28 kg/m²   Smoking Status Never   BSA 2.24 m²       Physical Exam   General: Patient is alert.  NAD.   HEENT: Sclera clear.  CVS: RRR.  Lungs: CTAB.   Abdomen: Soft.  Mild left lower quadrant abdominal tenderness to palpation with no guarding.  +BS.    Extremities: No pitting edema bilat ankles.  Psychiatric: Cooperative.     IOs    Intake/Output Summary (Last 24 hours) at 9/6/2024 1043  Last data filed at 9/6/2024 0551  Gross per 24 hour   Intake 1282 ml   Output --   Net 1282 ml       Labs:   Results from last 72 hours   Lab Units 09/06/24  0704 09/05/24  0621 09/04/24  0655   SODIUM mmol/L 138 134* 135*   POTASSIUM mmol/L 3.6 3.8 3.8   CHLORIDE mmol/L 104 100 100   CO2 mmol/L 24 28 24   BUN mg/dL 9 13 13   CREATININE mg/dL 0.89 1.02 1.17   GLUCOSE mg/dL 116* 116* 104*   CALCIUM mg/dL 8.1* 8.3* 8.8   ANION GAP mmol/L 14 10 15   EGFR mL/min/1.73m*2 >90 83 70   PHOSPHORUS mg/dL 2.7 3.0 4.0      Results from last 72 hours   Lab Units 09/06/24  0704 09/05/24  0621 09/04/24  0655   WBC AUTO x10*3/uL 7.6 8.7 12.1*   HEMOGLOBIN g/dL 12.4* 12.3* 14.3   HEMATOCRIT % 36.8* 37.3* 42.3   PLATELETS AUTO x10*3/uL 182 148* 161      Lab Results   Component Value Date    CALCIUM 8.1 (L) 09/06/2024    PHOS 2.7 09/06/2024      Lab Results   Component Value Date    CRP 0.22 10/03/2023      [unfilled]       Images  CT abdomen pelvis w IV and oral contrast  Narrative: Interpreted By:  Kike Bazan,   STUDY:  CT ABDOMEN PELVIS W IV AND ORAL CONTRAST;  9/5/2024 9:37 am    "   INDICATION:  Signs/Symptoms:diverticulitis with perforation, reevaluate, looking  for development of abscess.          COMPARISON:  09/03/2024 the/pelvis CT      ACCESSION NUMBER(S):  [id number]      ORDERING CLINICIAN:  CAMILA HOUGH      TECHNIQUE:  CT of the abdomen was performed.  Contiguous axial images were  obtained at 3 mm slice thickness through the abdomen. Coronal and  sagittal reconstructions at 3 mm slice thickness were performed. 75  ML of Omnipaque 350 was administered intravenously without immediate  complication.      FINDINGS:  LOWER CHEST:  The lower lobes have the follow up small linear areas of atelectasis.      ABDOMEN:      LIVER:  Normal.      BILE DUCTS:  Normal.      GALLBLADDER:  Normal.      PANCREAS:  Normal.      SPLEEN:  Normal.      ADRENAL GLANDS:  Normal.      KIDNEYS AND URETERS:  The mid left kidney has a 3 mm nonobstructive stone, unchanged. The  inferior pole of the right kidney has a less than 1 cm lucency,  unchanged. No hydronephrosis or hydroureter is noted. The bladder is  normal.      BOWEL:  The proximal to mid sigmoid colon has numerous diverticula, bowel  wall thickening and edema/inflammation in the pericolonic fat. The  pericolonic fat has a few bubbles of free air and pockets of fluid  approximately 1 cm or less similar to the prior CT. No new fluid  collection is identified. The amount of free air along the  anterior/dome of the liver has significantly decreased. The  inflammation within the fat extends inferiorly to the left inguinal  region, unchanged.      The caliber of the remaining bowel is normal. Multiple diverticula  are also noted along the splenic flexure and descending colon,  unchanged. The 2/3 portion of the duodenum has a 1-2 cm diverticulum,  unchanged.      VESSELS:  The aorta has a few scattered atherosclerotic calcifications      PERITONEUM/RETROPERITONEUM/LYMPH NODES:  No ascites is noted. No retroperitoneal or pelvic adenopathy is  noted.  Delete      ABDOMINAL WALL:  No hernia noted.      BONE AND SOFT TISSUE:  The L5-S1 disc has vacuum phenomena near complete loss of height,  unchanged.      Impression: 1. The overall inflammatory changes about the sigmoid colon are  similar to the prior CT with a few bubbles of air in the few small  collections of fluid within the pericolonic fat.      2. The amount of free intraperitoneal air has decreased.          MACRO:  None      Signed by: Kike Bazan 9/5/2024 11:13 AM  Dictation workstation:   [id number]      Meds  Scheduled medications  allopurinol, 300 mg, oral, Daily  buPROPion XL, 300 mg, oral, Daily  carvedilol, 12.5 mg, oral, BID  enoxaparin, 40 mg, subcutaneous, q24h  pantoprazole, 40 mg, intravenous, Daily before breakfast  piperacillin-tazobactam, 3.375 g, intravenous, Once  piperacillin-tazobactam, 3.375 g, intravenous, q6h  pregabalin, 50 mg, oral, Daily  tamsulosin, 0.4 mg, oral, Daily  venlafaxine XR, 37.5 mg, oral, Daily      Continuous medications  dextrose 5%-0.45 % sodium chloride, 75 mL/hr, Last Rate: 75 mL/hr (09/05/24 2206)      PRN medications  PRN medications: acetaminophen **OR** acetaminophen **OR** acetaminophen, fluticasone, hydrALAZINE, morphine, morphine, oxygen, prochlorperazine **OR** prochlorperazine **OR** prochlorperazine     Assessment and Plan    Durga Romero is a 64 y.o. male with past medical history of sciatica, hypertension, who came to the hospital secondary to left-sided abdominal pain and found to have perforated sigmoid diverticulitis.     Perforated sigmoid diverticulitis  -General Surgery following.  Repeat CT scan on 9/5 with overall inflammatory changes about the sigmoid colon similar to the prior CT with a few bubbles of air in the few small collections of fluid within the pericolonic fat; the amount of free intraperitoneal air has decreased.  -Await any recommendations from general surgery today, but at this point, general surgery not planning for  surgical intervention and continue with IV antibiotics.  -Patient advance to clear liquid diet on 9/5.  -ID following and recommends to continue Zosyn.  -Continue IV fluids, antiemetics as needed, pain medications as needed.  -Monitor.     Hypertension  -BP was elevated initially. This may be related to pain but also it appears patient is likely uncontrolled as an outpatient.  PCP had started patient on olmesartan on 8/30 in addition to his chronic med of carvedilol.  -As patient was n.p.o., patient was on IV Lopressor 5 mg every 6 hours.  Is patient taking orals now, change to oral Coreg 12.5 mg twice daily, which is his home med, today, 9/6.  Give IV hydralazine every 4 hours as needed as well as BP has trended up this morning, 9/6.  -Monitor.     Prostatitis  -Patient started on cephalexin on 8/30.  Urinalysis at the time was benign.  Patient on IV Zosyn for perforated sigmoid diverticulitis.  Urinalysis on admission also benign for infection.  -Continue home med of tamsulosin.  -Monitor.     Low back pain with radiculopathy  -Patient is followed by pain management and chronically on Lyrica.  PCP started patient on prednisone and added gabapentin on 8/30.  -Holding prednisone and gabapentin at this time especially as patient is NPO.  Continue Lyrica.  -Follow-up with pain management and PCP.     DVT prophylaxis  -Lovenox subcu.

## 2024-09-06 NOTE — PROGRESS NOTES
Durga Romero is a 64 y.o. male on day 3 of admission presenting with Acute diverticulitis.    Subjective   Pt continues to report feeling improvement in pain.   States pain in LLQ is better than yesterday. Still with inc pain with mvt/bending. Still with tenderness to LLQ but less than it was.   Mild nausea yesterday in evening but this improved with medicine.   No vomiting.  Tolerating PO clear liquid diet without diff.   Passing gas, no BM.   Urinating without diff.     No CP, SOB.  No fever, chills, night sweats.     States he worse CPAP last night until about 4 am.        Objective     Physical Exam  Vitals reviewed.   Constitutional:       General: He is not in acute distress.     Appearance: Normal appearance. He is obese. He is not ill-appearing, toxic-appearing or diaphoretic.   HENT:      Head: Normocephalic and atraumatic.      Mouth/Throat:      Mouth: Mucous membranes are moist.   Eyes:      General: No scleral icterus.        Right eye: No discharge.         Left eye: No discharge.      Conjunctiva/sclera: Conjunctivae normal.   Cardiovascular:      Rate and Rhythm: Normal rate and regular rhythm.      Pulses: Normal pulses.      Heart sounds: Normal heart sounds. No murmur heard.     No friction rub. No gallop.   Pulmonary:      Effort: Pulmonary effort is normal. No respiratory distress.      Breath sounds: Normal breath sounds. No stridor. No wheezing, rhonchi or rales.   Chest:      Chest wall: No tenderness.   Abdominal:      Tenderness: There is abdominal tenderness. There is no guarding.      Comments: Normal BS x 4 q. Abd soft and not with more firm area in LLQ as it was yesterday. Tender to LUQ and LLQ but less tender than yesterday.   Musculoskeletal:      Right lower leg: Edema present.      Left lower leg: Edema present.      Comments: + 1 pitting edema marco a lower ext.   Skin:     General: Skin is warm and dry.   Neurological:      Mental Status: He is alert and oriented to person, place,  "and time.      Motor: No weakness.      Gait: Gait normal.   Psychiatric:         Mood and Affect: Mood normal.         Behavior: Behavior normal.         Thought Content: Thought content normal.         Judgment: Judgment normal.         Last Recorded Vitals  Blood pressure (!) 163/100, pulse 70, temperature 36.8 °C (98.2 °F), temperature source Temporal, resp. rate 16, height 1.778 m (5' 10\"), weight 102 kg (225 lb), SpO2 96%.  At time of exam HR 75 and pulse ox 95% on RA.    Intake/Output last 3 Shifts:  I/O last 3 completed shifts:  In: 1382 (13.5 mL/kg) [P.O.:600; I.V.:582 (5.7 mL/kg); IV Piggyback:200]  Out: - (0 mL/kg)   Weight: 102.1 kg     Relevant Results    CT abdomen pelvis w IV and oral contrast    Result Date: 9/5/2024  Interpreted By:  Kike Bazan, STUDY: CT ABDOMEN PELVIS W IV AND ORAL CONTRAST;  9/5/2024 9:37 am   INDICATION: Signs/Symptoms:diverticulitis with perforation, reevaluate, looking for development of abscess.     COMPARISON: 09/03/2024 the/pelvis CT   ACCESSION NUMBER(S): [id number]   ORDERING CLINICIAN: CAMILA HOUGH   TECHNIQUE: CT of the abdomen was performed.  Contiguous axial images were obtained at 3 mm slice thickness through the abdomen. Coronal and sagittal reconstructions at 3 mm slice thickness were performed. 75 ML of Omnipaque 350 was administered intravenously without immediate complication.   FINDINGS: LOWER CHEST: The lower lobes have the follow up small linear areas of atelectasis.   ABDOMEN:   LIVER: Normal.   BILE DUCTS: Normal.   GALLBLADDER: Normal.   PANCREAS: Normal.   SPLEEN: Normal.   ADRENAL GLANDS: Normal.   KIDNEYS AND URETERS: The mid left kidney has a 3 mm nonobstructive stone, unchanged. The inferior pole of the right kidney has a less than 1 cm lucency, unchanged. No hydronephrosis or hydroureter is noted. The bladder is normal.   BOWEL: The proximal to mid sigmoid colon has numerous diverticula, bowel wall thickening and edema/inflammation in the " pericolonic fat. The pericolonic fat has a few bubbles of free air and pockets of fluid approximately 1 cm or less similar to the prior CT. No new fluid collection is identified. The amount of free air along the anterior/dome of the liver has significantly decreased. The inflammation within the fat extends inferiorly to the left inguinal region, unchanged.   The caliber of the remaining bowel is normal. Multiple diverticula are also noted along the splenic flexure and descending colon, unchanged. The 2/3 portion of the duodenum has a 1-2 cm diverticulum, unchanged.   VESSELS: The aorta has a few scattered atherosclerotic calcifications   PERITONEUM/RETROPERITONEUM/LYMPH NODES: No ascites is noted. No retroperitoneal or pelvic adenopathy is noted. Delete   ABDOMINAL WALL: No hernia noted.   BONE AND SOFT TISSUE: The L5-S1 disc has vacuum phenomena near complete loss of height, unchanged.       1. The overall inflammatory changes about the sigmoid colon are similar to the prior CT with a few bubbles of air in the few small collections of fluid within the pericolonic fat.   2. The amount of free intraperitoneal air has decreased.     MACRO: None   Signed by: Kike Bazan 9/5/2024 11:13 AM Dictation workstation:   [id number]    CT abdomen pelvis w IV contrast    Result Date: 9/3/2024  Interpreted By:  Mary Chang, STUDY: CT ABDOMEN PELVIS W IV CONTRAST;  9/3/2024 10:56 am   INDICATION: 65 y/o   M with  Signs/Symptoms:abd pain.   LIMITATIONS: None.   ACCESSION NUMBER(S): [id number]   ORDERING CLINICIAN: ARTURO WOODS   TECHNIQUE: After the administration of IV iodonated contrast, spiral axial images were obtained from the xiphoid down through the symphysis pubis. Sagittal and coronal reconstruction images were generated. 75 mL of Omnipaque 350.   COMPARISON: 06/22/2016.   FINDINGS: Lower Chest: Streaky bibasilar atelectasis.   Liver: The liver is unremarkable without focal lesion.   Gallbladder and  Biliary: Unremarkable.   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   Spleen: No abnormality identified in the spleen.   Adrenals: No abnormality identified in either adrenal gland.   Urinary: Subcentimeter hypodensity in the lower pole right kidney, too small to characterize. No hydronephrosis.   Gastrointestinal/Peritoneum: No small or large bowel obstruction in the visualized abdomen. Small foci of free air in the upper abdomen. Sigmoid colon diverticulosis. Extensive perisigmoid fat stranding with small foci of free air adjacent to the sigmoid colon with confluence areas of stranding and small pockets of non rim enhancing fluid. Diffuse colonic diverticulosis. Fluid in the right side of the colon. No evidence of acute appendicitis. Small bowel wall thickening in the perisigmoid region.   Vascular: Abdominal aorta is normal in caliber. Trace atherosclerosis.   Lymphatics: No enlarged lymph nodes by size criteria.   MSK/Body Wall: No aggressive bony lesion identified. Multilevel degenerative changes in the spine.       Perforated sigmoid diverticulitis with inflammatory changes in the perisigmoid region with small pockets of free fluid and pneumoperitoneum. No loculated abscess at this time.   Diffuse colonic diverticulosis.   Small bowel wall thickening in the perisigmoid region favored reactive.   Mary Chang sent epic message to  ARTURO WOODS on 9/3/2024 at 11:26 am.  (**-RCF-**) Findings:  See findings.     Signed by: Mary Chang 9/3/2024 11:41 AM Dictation workstation:   [id number]    Scheduled medications  allopurinol, 300 mg, oral, Daily  buPROPion XL, 300 mg, oral, Daily  carvedilol, 12.5 mg, oral, BID  enoxaparin, 40 mg, subcutaneous, q24h  pantoprazole, 40 mg, intravenous, Daily before breakfast  piperacillin-tazobactam, 3.375 g, intravenous, Once  piperacillin-tazobactam, 3.375 g, intravenous, q6h  pregabalin, 50 mg, oral, Daily  tamsulosin, 0.4 mg, oral,  Daily  venlafaxine XR, 37.5 mg, oral, Daily      Continuous medications  dextrose 5%-0.45 % sodium chloride, 75 mL/hr, Last Rate: 75 mL/hr (09/05/24 2206)      PRN medications  PRN medications: acetaminophen **OR** acetaminophen **OR** acetaminophen, fluticasone, hydrALAZINE, morphine, morphine, oxygen, prochlorperazine **OR** prochlorperazine **OR** prochlorperazine  Results for orders placed or performed during the hospital encounter of 09/03/24 (from the past 24 hour(s))   CBC   Result Value Ref Range    WBC 7.6 4.4 - 11.3 x10*3/uL    nRBC 0.0 0.0 - 0.0 /100 WBCs    RBC 3.98 (L) 4.50 - 5.90 x10*6/uL    Hemoglobin 12.4 (L) 13.5 - 17.5 g/dL    Hematocrit 36.8 (L) 41.0 - 52.0 %    MCV 93 80 - 100 fL    MCH 31.2 26.0 - 34.0 pg    MCHC 33.7 32.0 - 36.0 g/dL    RDW 14.0 11.5 - 14.5 %    Platelets 182 150 - 450 x10*3/uL   Comprehensive Metabolic Panel   Result Value Ref Range    Glucose 116 (H) 74 - 99 mg/dL    Sodium 138 136 - 145 mmol/L    Potassium 3.6 3.5 - 5.3 mmol/L    Chloride 104 98 - 107 mmol/L    Bicarbonate 24 21 - 32 mmol/L    Anion Gap 14 10 - 20 mmol/L    Urea Nitrogen 9 6 - 23 mg/dL    Creatinine 0.89 0.50 - 1.30 mg/dL    eGFR >90 >60 mL/min/1.73m*2    Calcium 8.1 (L) 8.6 - 10.3 mg/dL    Albumin 3.0 (L) 3.4 - 5.0 g/dL    Alkaline Phosphatase 46 33 - 136 U/L    Total Protein 5.8 (L) 6.4 - 8.2 g/dL    AST 23 9 - 39 U/L    Bilirubin, Total 1.0 0.0 - 1.2 mg/dL    ALT 37 10 - 52 U/L   Magnesium   Result Value Ref Range    Magnesium 2.11 1.60 - 2.40 mg/dL   Phosphorus   Result Value Ref Range    Phosphorus 2.7 2.5 - 4.9 mg/dL                            Assessment/Plan   Assessment & Plan  Acute diverticulitis    Diverticulitis of colon with perforation    Diverticulitis with perforation  - reviewed am labs. ALT and bilirubin normalized. WBC WNL. Anemia stable. Will cont to monitor labs.  - enc pt to eat a low fat/low cholesterol diet to help liver and keep alcohol to not more than 1 drink 3 d a week.  - repeat  CT abd pelvis yesterday did not reveal abscess.  - cont IV ABX with zosyn q 6 per ID recommendation. Completed IV flagyl.   - diet was advanced to clears yesterday after results of CT. He is tolerating this. Advance to full liquid diet today.   - D5 1/2 NS at 75 an hour.   - IS and enc to use 10 x an hour.  - recommend CPAP use HS.  - lovenox for anticoagulation.  - SCDs while in bed.  - compazine PRN nausea.  - tylenol/morphine PRN pain.  - activity as tolerated.        - remainder of care per primary.        I spent 35 minutes in the professional and overall care of this patient.    Plan of care discussed with Dr. Garcia.     Evelia Currie, APRN-CNP

## 2024-09-06 NOTE — PROGRESS NOTES
Durga Romero is a 64 y.o. male on day 3 of admission presenting with Acute diverticulitis.    Subjective   Interval History: no fever, no new complaints , less pain       Review of Systems    Objective   Range of Vitals (last 24 hours)  Heart Rate:  []   Temp:  [36.8 °C (98.2 °F)-37.1 °C (98.8 °F)]   Resp:  [16-20]   BP: (132-163)/()   SpO2:  [93 %-96 %]   Daily Weight  09/03/24 : 102 kg (225 lb)    Body mass index is 32.28 kg/m².    Physical Exam  Constitutional:       Appearance: Normal appearance.   HENT:      Head: Normocephalic and atraumatic.      Mouth/Throat:      Mouth: Mucous membranes are moist.      Pharynx: Oropharynx is clear.   Eyes:      Pupils: Pupils are equal, round, and reactive to light.   Cardiovascular:      Rate and Rhythm: Normal rate and regular rhythm.      Heart sounds: Normal heart sounds.   Pulmonary:      Effort: Pulmonary effort is normal.      Breath sounds: Normal breath sounds.   Chest:      Chest wall: Tenderness: reviewed.   Abdominal:      General: Abdomen is flat. Bowel sounds are normal.      Palpations: Abdomen is soft.      Tenderness: There is abdominal tenderness.   Musculoskeletal:      Cervical back: Normal range of motion.   Neurological:      Mental Status: He is alert.         Antibiotics  cephalexin - 500 mg  piperacillin-tazobactam - 3.375 gram/50 mL, 3.375 gram/50 mL    Relevant Results  Labs  Results from last 72 hours   Lab Units 09/06/24  0704 09/05/24  0621 09/04/24  0655   WBC AUTO x10*3/uL 7.6 8.7 12.1*   HEMOGLOBIN g/dL 12.4* 12.3* 14.3   HEMATOCRIT % 36.8* 37.3* 42.3   PLATELETS AUTO x10*3/uL 182 148* 161     Results from last 72 hours   Lab Units 09/06/24  0704 09/05/24  0621 09/04/24  0655   SODIUM mmol/L 138 134* 135*   POTASSIUM mmol/L 3.6 3.8 3.8   CHLORIDE mmol/L 104 100 100   CO2 mmol/L 24 28 24   BUN mg/dL 9 13 13   CREATININE mg/dL 0.89 1.02 1.17   GLUCOSE mg/dL 116* 116* 104*   CALCIUM mg/dL 8.1* 8.3* 8.8   ANION GAP mmol/L 14 10  15   EGFR mL/min/1.73m*2 >90 83 70   PHOSPHORUS mg/dL 2.7 3.0 4.0     Results from last 72 hours   Lab Units 09/06/24  0704 09/05/24  0621 09/04/24  0655   ALK PHOS U/L 46 38 43   BILIRUBIN TOTAL mg/dL 1.0 1.0 2.1*   PROTEIN TOTAL g/dL 5.8* 5.7* 6.5   ALT U/L 37 36 54*   AST U/L 23 17 22   ALBUMIN g/dL 3.0* 2.9* 3.5     Estimated Creatinine Clearance: 103 mL/min (by C-G formula based on SCr of 0.89 mg/dL).  C-Reactive Protein   Date Value Ref Range Status   10/03/2023 0.22 <1.00 mg/dL Final     Microbiology  Reviewed  Imaging   Reviewed       Assessment/Plan     Perforated sigmoid diverticulitis, the repeat CT similar findings     Recommendations :  Continue Zosyn  Increase the activity     I spent minutes in the professional and overall care of this patient.      Jose Alfredo Mcmillan MD

## 2024-09-06 NOTE — PROGRESS NOTES
09/06/24 1041   Discharge Planning   Living Arrangements Spouse/significant other   Support Systems Spouse/significant other   Assistance Needed Alert and oriented x 3; Independent with ADL's, Cane used at home, Drives.   Type of Residence Private residence   Number of Stairs to Enter Residence 2   Number of Stairs Within Residence 14   Do you have animals or pets at home? No   Who is requesting discharge planning? Provider   Home or Post Acute Services None   Expected Discharge Disposition Home   Does the patient need discharge transport arranged? No   Financial Resource Strain   How hard is it for you to pay for the very basics like food, housing, medical care, and heating? Not hard   Housing Stability   In the last 12 months, was there a time when you were not able to pay the mortgage or rent on time? N   In the past 12 months, how many times have you moved where you were living? 0   At any time in the past 12 months, were you homeless or living in a shelter (including now)? N   Transportation Needs   In the past 12 months, has lack of transportation kept you from medical appointments or from getting medications? no   In the past 12 months, has lack of transportation kept you from meetings, work, or from getting things needed for daily living? No   Patient Choice   Provider Choice list and CMS website (https://medicare.gov/care-compare#search) for post-acute Quality and Resource Measure Data were provided and reviewed with: Patient   Patient / Family choosing to utilize agency / facility established prior to hospitalization No

## 2024-09-07 VITALS
HEART RATE: 68 BPM | DIASTOLIC BLOOD PRESSURE: 80 MMHG | SYSTOLIC BLOOD PRESSURE: 151 MMHG | OXYGEN SATURATION: 93 % | WEIGHT: 225 LBS | RESPIRATION RATE: 18 BRPM | HEIGHT: 70 IN | TEMPERATURE: 97.2 F | BODY MASS INDEX: 32.21 KG/M2

## 2024-09-07 LAB
ALBUMIN SERPL BCP-MCNC: 3.3 G/DL (ref 3.4–5)
ALP SERPL-CCNC: 59 U/L (ref 33–136)
ALT SERPL W P-5'-P-CCNC: 51 U/L (ref 10–52)
ANION GAP SERPL CALC-SCNC: 14 MMOL/L (ref 10–20)
AST SERPL W P-5'-P-CCNC: 35 U/L (ref 9–39)
BILIRUB SERPL-MCNC: 1.1 MG/DL (ref 0–1.2)
BUN SERPL-MCNC: 11 MG/DL (ref 6–23)
CALCIUM SERPL-MCNC: 8.6 MG/DL (ref 8.6–10.3)
CHLORIDE SERPL-SCNC: 103 MMOL/L (ref 98–107)
CO2 SERPL-SCNC: 24 MMOL/L (ref 21–32)
CREAT SERPL-MCNC: 0.94 MG/DL (ref 0.5–1.3)
EGFRCR SERPLBLD CKD-EPI 2021: >90 ML/MIN/1.73M*2
ERYTHROCYTE [DISTWIDTH] IN BLOOD BY AUTOMATED COUNT: 14 % (ref 11.5–14.5)
GLUCOSE SERPL-MCNC: 99 MG/DL (ref 74–99)
HCT VFR BLD AUTO: 39.4 % (ref 41–52)
HGB BLD-MCNC: 13.4 G/DL (ref 13.5–17.5)
MAGNESIUM SERPL-MCNC: 2.33 MG/DL (ref 1.6–2.4)
MCH RBC QN AUTO: 31.4 PG (ref 26–34)
MCHC RBC AUTO-ENTMCNC: 34 G/DL (ref 32–36)
MCV RBC AUTO: 92 FL (ref 80–100)
NRBC BLD-RTO: 0 /100 WBCS (ref 0–0)
PHOSPHATE SERPL-MCNC: 3.3 MG/DL (ref 2.5–4.9)
PLATELET # BLD AUTO: 211 X10*3/UL (ref 150–450)
POTASSIUM SERPL-SCNC: 3.7 MMOL/L (ref 3.5–5.3)
PROT SERPL-MCNC: 6.4 G/DL (ref 6.4–8.2)
RBC # BLD AUTO: 4.27 X10*6/UL (ref 4.5–5.9)
SODIUM SERPL-SCNC: 137 MMOL/L (ref 136–145)
WBC # BLD AUTO: 7.8 X10*3/UL (ref 4.4–11.3)

## 2024-09-07 PROCEDURE — 84100 ASSAY OF PHOSPHORUS: CPT | Performed by: INTERNAL MEDICINE

## 2024-09-07 PROCEDURE — 80053 COMPREHEN METABOLIC PANEL: CPT | Performed by: INTERNAL MEDICINE

## 2024-09-07 PROCEDURE — 94660 CPAP INITIATION&MGMT: CPT

## 2024-09-07 PROCEDURE — 2500000001 HC RX 250 WO HCPCS SELF ADMINISTERED DRUGS (ALT 637 FOR MEDICARE OP): Performed by: INTERNAL MEDICINE

## 2024-09-07 PROCEDURE — 99239 HOSP IP/OBS DSCHRG MGMT >30: CPT | Performed by: INTERNAL MEDICINE

## 2024-09-07 PROCEDURE — 99231 SBSQ HOSP IP/OBS SF/LOW 25: CPT | Performed by: SURGERY

## 2024-09-07 PROCEDURE — 85027 COMPLETE CBC AUTOMATED: CPT | Performed by: INTERNAL MEDICINE

## 2024-09-07 PROCEDURE — 36415 COLL VENOUS BLD VENIPUNCTURE: CPT | Performed by: INTERNAL MEDICINE

## 2024-09-07 PROCEDURE — 83735 ASSAY OF MAGNESIUM: CPT | Performed by: INTERNAL MEDICINE

## 2024-09-07 PROCEDURE — 2500000004 HC RX 250 GENERAL PHARMACY W/ HCPCS (ALT 636 FOR OP/ED): Performed by: NURSE PRACTITIONER

## 2024-09-07 PROCEDURE — 2500000004 HC RX 250 GENERAL PHARMACY W/ HCPCS (ALT 636 FOR OP/ED): Performed by: INTERNAL MEDICINE

## 2024-09-07 PROCEDURE — 2500000002 HC RX 250 W HCPCS SELF ADMINISTERED DRUGS (ALT 637 FOR MEDICARE OP, ALT 636 FOR OP/ED): Performed by: INTERNAL MEDICINE

## 2024-09-07 RX ORDER — DOCUSATE SODIUM 100 MG/1
100 CAPSULE, LIQUID FILLED ORAL 2 TIMES DAILY
Qty: 60 CAPSULE | Refills: 0 | Status: SHIPPED | OUTPATIENT
Start: 2024-09-07 | End: 2024-09-07

## 2024-09-07 RX ORDER — AMOXICILLIN AND CLAVULANATE POTASSIUM 875; 125 MG/1; MG/1
1 TABLET, FILM COATED ORAL 2 TIMES DAILY
Qty: 20 TABLET | Refills: 0 | Status: SHIPPED | OUTPATIENT
Start: 2024-09-07

## 2024-09-07 RX ORDER — DOCUSATE SODIUM 100 MG/1
100 CAPSULE, LIQUID FILLED ORAL 2 TIMES DAILY
Qty: 60 CAPSULE | Refills: 0 | Status: SHIPPED | OUTPATIENT
Start: 2024-09-07

## 2024-09-07 RX ORDER — DOCUSATE SODIUM 100 MG/1
100 CAPSULE, LIQUID FILLED ORAL 2 TIMES DAILY
Status: DISCONTINUED | OUTPATIENT
Start: 2024-09-07 | End: 2024-09-07 | Stop reason: HOSPADM

## 2024-09-07 RX ORDER — AMOXICILLIN AND CLAVULANATE POTASSIUM 875; 125 MG/1; MG/1
1 TABLET, FILM COATED ORAL 2 TIMES DAILY
Qty: 20 TABLET | Refills: 0 | Status: SHIPPED | OUTPATIENT
Start: 2024-09-07 | End: 2024-09-07

## 2024-09-07 ASSESSMENT — COGNITIVE AND FUNCTIONAL STATUS - GENERAL
DAILY ACTIVITIY SCORE: 24
MOBILITY SCORE: 24

## 2024-09-07 ASSESSMENT — PAIN SCALES - GENERAL: PAINLEVEL_OUTOF10: 0 - NO PAIN

## 2024-09-07 NOTE — PROGRESS NOTES
"Durga Romero is a 64 y.o. male on day 4 of admission presenting with Acute diverticulitis.    Subjective   The patient states that he feels better, he has less pain, and he moved his bowels twice yesterday afternoon       Objective no fever, vital signs are stable    Physical Exam abdomen is flat soft he still has some tenderness to the touch in the left lower quadrant but it is not as firm as it had been.    Last Recorded Vitals  Blood pressure 151/80, pulse 68, temperature 36.2 °C (97.2 °F), temperature source Temporal, resp. rate 18, height 1.778 m (5' 10\"), weight 102 kg (225 lb), SpO2 93%.  Intake/Output last 3 Shifts:  I/O last 3 completed shifts:  In: 2079.5 (20.4 mL/kg) [P.O.:360; I.V.:1519.5 (14.9 mL/kg); IV Piggyback:200]  Out: - (0 mL/kg)   Weight: 102.1 kg     Relevant Results               White blood cell count remains normal and his hemoglobin is coming back up to normal               Assessment/Plan perforated diverticulitis managed medically, I feel the patient would be able to be discharged home today after lunch, recommend a low residue diet, Colace 2 times daily, continued oral antibiotics as per infectious disease, and follow-up with me in my office in 2 weeks  Assessment & Plan  Acute diverticulitis    Diverticulitis of colon with perforation           I spent 10 minutes in the professional and overall care of this patient.      Frida Garcia MD      "

## 2024-09-07 NOTE — DISCHARGE SUMMARY
Discharge Diagnosis  Acute diverticulitis    Issues Requiring Follow-Up  -Follow-up with primary care provider within the next 1 week.  -Follow-up with general surgery within the next 1 week.    Discharge Meds     Medication List      START taking these medications     amoxicillin-pot clavulanate 875-125 mg tablet; Commonly known as:   Augmentin; Take 1 tablet by mouth 2 times a day.   docusate sodium 100 mg capsule; Commonly known as: Colace; Take 1   capsule (100 mg) by mouth 2 times a day.     CONTINUE taking these medications     allopurinol 300 mg tablet; Commonly known as: Zyloprim; TAKE 1 TABLET   DAILY   buPROPion  mg 24 hr tablet; Commonly known as: Wellbutrin XL; TAKE   1 TABLET DAILY   carvedilol 12.5 mg tablet; Commonly known as: Coreg; TAKE 1 TABLET TWICE   A DAY WITH MEALS   cholecalciferol 400 unit capsule; Commonly known as: Vitamin D-3   fish oil concentrate 120-180 mg capsule; Commonly known as: Omega-3   fluticasone 50 mcg/actuation nasal spray; Commonly known as: Flonase   olmesartan 20 mg tablet; Commonly known as: BENIcar; Take 1 tablet (20   mg) by mouth once daily.   pregabalin 50 mg capsule; Commonly known as: Lyrica   rosuvastatin 10 mg tablet; Commonly known as: Crestor; TAKE 1 TABLET   DAILY   sildenafil 100 mg tablet; Commonly known as: Viagra   tamsulosin 0.4 mg 24 hr capsule; Commonly known as: Flomax; Take 1   capsule (0.4 mg) by mouth once daily.   tiZANidine 4 mg tablet; Commonly known as: Zanaflex   venlafaxine XR 37.5 mg 24 hr capsule; Commonly known as: Effexor-XR;   Take 1 capsule (37.5 mg) by mouth once daily. Do not crush or chew.     STOP taking these medications     cephalexin 500 mg capsule; Commonly known as: Keflex   gabapentin 300 mg capsule; Commonly known as: Neurontin   predniSONE 10 mg tablet; Commonly known as: Deltasone       Test Results Pending At Discharge  Pending Labs       No current pending labs.            Hospital Course  Durga Romero is a 64  y.o. male with past medical history of sciatica, hypertension, who came to the hospital secondary to left-sided abdominal pain and found to have perforated sigmoid diverticulitis.     Perforated sigmoid diverticulitis  -General Surgery followed the patient and recommended medical management with no surgical intervention.  Patient did have a repeat CT scan on 9/5 with overall inflammatory changes about the sigmoid colon similar to the prior CT with a few bubbles of air in the few small collections of fluid within the pericolonic fat; the amount of free intraperitoneal air has decreased.  -ID followed the patient as well and patient was placed on IV Zosyn.  -Patient clinically improved and his hospital stay.  On the day of discharge, patient reports minimal pain in the left side of the abdomen and denies nausea, vomiting, chest pain or shortness of breath.  He reports having 2 solid bowel movements over the past 24 hours.  I discussed the case with Dr. Garcia, general surgery, on the day of discharge, and she is okay with patient being discharged home on a low residue diet along with oral antibiotics with Augmentin and Colace twice daily.  I also discussed the case with Dr. Mcmillan, ID, on the day of discharge, who recommends 10 more days of oral Augmentin on discharge home.  Will discharge patient home and have him follow-up with primary care provider and general surgery as an outpatient.  I advised patient come to ER if any problems arise any voiced understanding.    Right arm swelling  -Patient had right arm swelling on 9/6.  Ultrasound of the right upper treatment was performed with no evidence of DVT.  Edema likely due to IV fluids as patient had IV site in the right arm.  This was removed on 9/6.  On the day of discharge, some improvement of his right arm swelling but still present.  Recommend to keep arm elevated and monitor and follow-up with primary care provider within the next 1 week.      Hypertension  -Continue home medication of Coreg and patient recently started on olmesartan on 8/30 as well.    -Recommend to follow-up with primary care provider for monitoring of blood pressure.     Prostatitis  -Patient started on cephalexin on 8/30.  Urinalysis at the time was benign.  Urinalysis on admission also benign for infection.  Patient was on IV Zosyn while inpatient and will be on oral Augmentin on discharge home as stated above.  -Continue home med of tamsulosin.  -Follow-up with primary care provider as an outpatient for monitoring.     Low back pain with radiculopathy  -Patient is followed by pain management and chronically on Lyrica.  PCP started patient on prednisone and added gabapentin on 8/30.  -We have held prednisone and we will not continue prednisone on discharge home either due to perforated diverticulitis.  We held gabapentin as well, which was recently started by PCP, as patient is chronically on Lyrica as well.  -Recommended follow-up with pain management and PCP as an outpatient.     Time spent on discharge is approximately 35 minutes.  Pertinent Physical Exam At Time of Discharge  General: Patient is alert.  No acute distress.   HEENT: Sclera clear.  CVS: RRR.  Lungs: CTAB.   Abdomen: Soft.  Mild left lower quadrant abdominal tenderness to palpation with no guarding.  Bowel sounds are present.  Extremities: No pitting edema bilat ankles.  Right upper extremity with edema from the forearm up to the bicep region.  Psychiatric: Cooperative.    Outpatient Follow-Up  Future Appointments   Date Time Provider Department Center   9/16/2024  4:20 PM Fartun Dickerson DO [id number]         Nghia Gregg DO

## 2024-09-07 NOTE — CARE PLAN
Problem: Pain  Goal: Takes deep breaths with improved pain control throughout the shift  Outcome: Progressing   The patient's goals for the shift include      The clinical goals for the shift include patient will deny nausea and ABD pain this shift    Patient alert and oriented. Independent with ADLS. Patient is complaining of pain and nausea after initiating clear liquid diet. See orders for IV phenergan. And IV morphine given. Patient also instructed to not intake anymore liquids in nausea is present.     
  Problem: Pain  Goal: Takes deep breaths with improved pain control throughout the shift  Outcome: Progressing  Goal: Turns in bed with improved pain control throughout the shift  Outcome: Progressing  Goal: Walks with improved pain control throughout the shift  Outcome: Progressing  Goal: Performs ADL's with improved pain control throughout shift  Outcome: Progressing  Goal: Participates in PT with improved pain control throughout the shift  Outcome: Progressing  Goal: Free from opioid side effects throughout the shift  Outcome: Progressing  Goal: Free from acute confusion related to pain meds throughout the shift  Outcome: Progressing     Problem: Chronic Conditions and Co-morbidities  Goal: Patient's chronic conditions and co-morbidity symptoms are monitored and maintained or improved  Outcome: Progressing     Problem: Safety - Adult  Goal: Free from fall injury  Outcome: Progressing   The patient's goals for the shift include      The clinical goals for the shift include Patient will have pain well controlled throughout this shift.  
  Problem: Pain  Goal: Turns in bed with improved pain control throughout the shift  Outcome: Progressing  Goal: Performs ADL's with improved pain control throughout shift  Outcome: Progressing     Problem: Safety - Adult  Goal: Free from fall injury  Outcome: Progressing   The patient's goals for the shift include  reduce pain    The clinical goals for the shift include Pt will have a decrease in pain throughout shift        
  Problem: Pain  Goal: Turns in bed with improved pain control throughout the shift  Outcome: Progressing  Goal: Walks with improved pain control throughout the shift  Outcome: Progressing  Goal: Performs ADL's with improved pain control throughout shift  Outcome: Progressing     Problem: Safety - Adult  Goal: Free from fall injury  Outcome: Progressing   The patient's goals for the shift include  no pain or nausea throughout the night     The clinical goals for the shift include patient will have no reports of pain during shift     During shift patient had no reports of pain or nausea. Patient safety maintained, assistance with ambulation,call light in reach, hourly rounding.     
  Problem: Pain  Goal: Walks with improved pain control throughout the shift  Outcome: Progressing  Goal: Free from opioid side effects throughout the shift  Outcome: Progressing  Goal: Free from acute confusion related to pain meds throughout the shift  Outcome: Progressing     Problem: Pain - Adult  Goal: Verbalizes/displays adequate comfort level or baseline comfort level  Outcome: Progressing     Problem: Safety - Adult  Goal: Free from fall injury  Outcome: Progressing   The patient's goals for the shift include having less abdominal pain    The clinical goals for the shift include patient will have adequate pain control during this shift     Over the shift, the patient did not make progress toward the following goals. Barriers to progression include pain with ambulation,. Recommendations to address these barriers include assistance with ambulation, adequate pain control, sleep hygiene.    
The patient's goals for the shift include  pain control.    The clinical goals for the shift include Pt will have pain well controlled this shift.      Problem: Pain  Goal: Takes deep breaths with improved pain control throughout the shift  Outcome: Progressing  Goal: Walks with improved pain control throughout the shift  Outcome: Progressing  Goal: Free from opioid side effects throughout the shift  Outcome: Progressing     Problem: Safety - Adult  Goal: Free from fall injury  Outcome: Progressing     Problem: Chronic Conditions and Co-morbidities  Goal: Patient's chronic conditions and co-morbidity symptoms are monitored and maintained or improved  Outcome: Progressing     Pt received antibiotics as ordered and pain meds as needed. Pt required 2L via NC overnight due to pulse ox of 85%.  Pt has sleep apnea but didn't want CPAP at this time.  Will continue to monitor.    
The patient's goals for the shift include  pt safety    The clinical goals for the shift include pt will have no reports of Nausea or abdominal pain throughout shift    Over the shift, the patient had no complaints of nausea, vomiting, or abdominal discomfort. Pt safety maintained with call light within reach, bed in lowest position and frequent rounding.     Problem: Pain  Goal: Takes deep breaths with improved pain control throughout the shift  Outcome: Progressing  Goal: Turns in bed with improved pain control throughout the shift  Outcome: Progressing  Goal: Walks with improved pain control throughout the shift  Outcome: Progressing  Goal: Performs ADL's with improved pain control throughout shift  Outcome: Progressing     Problem: Pain - Adult  Goal: Verbalizes/displays adequate comfort level or baseline comfort level  Outcome: Progressing     Problem: Safety - Adult  Goal: Free from fall injury  Outcome: Progressing     Problem: Discharge Planning  Goal: Discharge to home or other facility with appropriate resources  Outcome: Progressing     Problem: Chronic Conditions and Co-morbidities  Goal: Patient's chronic conditions and co-morbidity symptoms are monitored and maintained or improved  Outcome: Progressing       
[FreeTextEntry1] : Pt is a 69F with PMHx choledocholithiasis s/p ERCP c/b post-ERCP pancreatitis, HTN, and DMII presenting for CPE.\par \par #HTN\par -BP at goal today\par -carvedilol, amlodipine-benazepril renewed\par \par #DM\par -pt educated on eating low carbohydrate diet with regular exercise\par -check HgA1c\par \par #CPE\par -declined HIV screening\par -pt strongly advised to take flu vaccine and Tdap but pt endorses to permanently deferral all vaccination\par -pt strongly advised to start high intensity statin given her 10 year ASCVD score of 21, risk and benefit of statin explained to patient, pt declined statin\par -check CBC, cmp, A1c, lipid profile, microalbumin\par -FOBT provided\par \par pretty Moreno
7

## 2024-09-07 NOTE — PROGRESS NOTES
09/07/24 0900   Discharge Planning   Expected Discharge Disposition Home  (DC today and patient fine with dc. Patient denies home going needs. Pt ride will be here at 1pm to transport home.)

## 2024-09-07 NOTE — PROGRESS NOTES
Durga Romero is a 64 y.o. male on day 4 of admission presenting with Acute diverticulitis.    Subjective   Interval History: no fever, no new complaints , less pain       Review of Systems    Objective   Range of Vitals (last 24 hours)  Heart Rate:  [68-76]   Temp:  [35.7 °C (96.3 °F)-36.3 °C (97.3 °F)]   Resp:  [16-18]   BP: (119-151)/(79-91)   SpO2:  [93 %-95 %]   Daily Weight  09/03/24 : 102 kg (225 lb)    Body mass index is 32.28 kg/m².    Physical Exam  Constitutional:       Appearance: Normal appearance.   HENT:      Head: Normocephalic and atraumatic.      Mouth/Throat:      Mouth: Mucous membranes are moist.      Pharynx: Oropharynx is clear.   Eyes:      Pupils: Pupils are equal, round, and reactive to light.   Cardiovascular:      Rate and Rhythm: Normal rate and regular rhythm.      Heart sounds: Normal heart sounds.   Pulmonary:      Effort: Pulmonary effort is normal.      Breath sounds: Normal breath sounds.   Chest:      Chest wall: Tenderness: reviewed.   Abdominal:      General: Abdomen is flat. Bowel sounds are normal.      Palpations: Abdomen is soft.      Tenderness: There is abdominal tenderness.   Musculoskeletal:      Cervical back: Normal range of motion.   Neurological:      Mental Status: He is alert.         Antibiotics  amoxicillin-pot clavulanate - 875-125 mg  cephalexin - 500 mg  piperacillin-tazobactam - 3.375 gram/50 mL, 3.375 gram/50 mL    Relevant Results  Labs  Results from last 72 hours   Lab Units 09/07/24 0717 09/06/24  0704 09/05/24  0621   WBC AUTO x10*3/uL 7.8 7.6 8.7   HEMOGLOBIN g/dL 13.4* 12.4* 12.3*   HEMATOCRIT % 39.4* 36.8* 37.3*   PLATELETS AUTO x10*3/uL 211 182 148*     Results from last 72 hours   Lab Units 09/07/24  0717 09/06/24  0704 09/05/24  0621   SODIUM mmol/L 137 138 134*   POTASSIUM mmol/L 3.7 3.6 3.8   CHLORIDE mmol/L 103 104 100   CO2 mmol/L 24 24 28   BUN mg/dL 11 9 13   CREATININE mg/dL 0.94 0.89 1.02   GLUCOSE mg/dL 99 116* 116*   CALCIUM mg/dL 8.6  8.1* 8.3*   ANION GAP mmol/L 14 14 10   EGFR mL/min/1.73m*2 >90 >90 83   PHOSPHORUS mg/dL 3.3 2.7 3.0     Results from last 72 hours   Lab Units 09/07/24  0717 09/06/24  0704 09/05/24  0621   ALK PHOS U/L 59 46 38   BILIRUBIN TOTAL mg/dL 1.1 1.0 1.0   PROTEIN TOTAL g/dL 6.4 5.8* 5.7*   ALT U/L 51 37 36   AST U/L 35 23 17   ALBUMIN g/dL 3.3* 3.0* 2.9*     Estimated Creatinine Clearance: 97.5 mL/min (by C-G formula based on SCr of 0.94 mg/dL).  C-Reactive Protein   Date Value Ref Range Status   10/03/2023 0.22 <1.00 mg/dL Final     Microbiology  Reviewed  Imaging   Reviewed       Assessment/Plan     Perforated sigmoid diverticulitis, the repeat CT similar findings, tolerating oral intake     Recommendations :  Plan on oral Augmentin with discharge  Increase the activity  Discussed with the medical team     I spent minutes in the professional and overall care of this patient.      Jose Alfredo Mcmillan MD

## 2024-09-09 ENCOUNTER — OFFICE VISIT (OUTPATIENT)
Dept: SURGERY | Facility: CLINIC | Age: 64
End: 2024-09-09
Payer: COMMERCIAL

## 2024-09-09 VITALS
WEIGHT: 216.4 LBS | BODY MASS INDEX: 30.98 KG/M2 | TEMPERATURE: 96.6 F | HEIGHT: 70 IN | HEART RATE: 81 BPM | DIASTOLIC BLOOD PRESSURE: 80 MMHG | OXYGEN SATURATION: 97 % | SYSTOLIC BLOOD PRESSURE: 115 MMHG

## 2024-09-09 DIAGNOSIS — K57.20 DIVERTICULITIS OF COLON WITH PERFORATION: Primary | ICD-10-CM

## 2024-09-09 DIAGNOSIS — K57.20 DIVERTICULITIS OF COLON WITH PERFORATION: ICD-10-CM

## 2024-09-09 PROCEDURE — 3008F BODY MASS INDEX DOCD: CPT | Performed by: SURGERY

## 2024-09-09 PROCEDURE — 3074F SYST BP LT 130 MM HG: CPT | Performed by: SURGERY

## 2024-09-09 PROCEDURE — 99213 OFFICE O/P EST LOW 20 MIN: CPT | Performed by: SURGERY

## 2024-09-09 PROCEDURE — 3079F DIAST BP 80-89 MM HG: CPT | Performed by: SURGERY

## 2024-09-09 NOTE — PROGRESS NOTES
Subjective   Patient ID: Durga Romero is a 64 y.o. male who presents for Follow-up (Acute diverticulitis ).  HPI this is a pleasant gentleman who was just recently discharged from the hospital.  He was admitted with perforated diverticulitis and spent several days in the hospital on intravenous antibiotics.  He had a repeat CAT scan a few days after presentation that shows some resolution of the inflammatory process but basically unchanged.  His pain slowly resolved and he was started on a low residue diet and discharged.  He comes in today stating he still having some left almost groin pain.    Review of Systems he is eating he is moving his bowels his bowel movements are soft and he does not have to strain.  He did have a Caesar salad yesterday which was not on his low residue plan.    Objective no fever  Physical Exam head is normocephalic atraumatic eyes extraocular movements are intact pupils are equal and round lungs are clear bilaterally heart is regular rate and rhythm abdomen is flat and soft he has a little firmness in the left groin region or a little above that area and this is where it is tender but he is not jumping off the table it is not nearly as tender as it was even a few days ago while he was still in the hospital.    Assessment/Plan I would recommend that we order a CT scan of the abdomen and pelvis as a follow-up and he should continue the low residue diet and see me again in 2 weeks in the office if the pain worsens call just as he did today.           Frida Garcia MD 09/09/24 2:55 PM

## 2024-09-10 ENCOUNTER — DOCUMENTATION (OUTPATIENT)
Dept: CARE COORDINATION | Facility: CLINIC | Age: 64
End: 2024-09-10
Payer: COMMERCIAL

## 2024-09-10 ENCOUNTER — PATIENT OUTREACH (OUTPATIENT)
Dept: CARE COORDINATION | Facility: CLINIC | Age: 64
End: 2024-09-10
Payer: COMMERCIAL

## 2024-09-10 SDOH — ECONOMIC STABILITY: GENERAL: WOULD YOU LIKE HELP WITH ANY OF THE FOLLOWING NEEDS?: I DONT NEED HELP WITH ANY OF THESE

## 2024-09-10 NOTE — PROGRESS NOTES
Outreach call to patient following up on appointment with primary care provider.  Discussed appointment, reviewed medications, and discussed plan of care.  Patient with no additional questions at this time.  Will continue to follow.    Rae ARVIZU, RN, [facility]  O: [phone]

## 2024-09-10 NOTE — SIGNIFICANT EVENT
Follow Up Phone Call    Outgoing phone call    Spoke to: Durga Romero Relationship:self   Phone number: [phone]      Outcome: contacted patient/ family   Chief Complaint   Patient presents with    Abdominal Pain    Nausea          Diagnosis:Not applicable    States he is feeling better. No further questions or concerns.

## 2024-09-10 NOTE — PROGRESS NOTES
Outreach call to patient to support a smooth transition of care from recent admission.  Spoke with patient, reviewed discharge medications, discharge instructions, assessed social needs, and provided education on importance of follow-up appointment with provider.  Will continue to monitor through transition period.  Medications  Medications reviewed with patient/caregiver?: Yes (9/10/2024 11:56 AM)  Is the patient having any side effects they believe may be caused by any medication additions or changes?: No (9/10/2024 11:56 AM)  Does the patient have all medications ordered at discharge?: Yes (9/10/2024 11:56 AM)  Care Management Interventions: No intervention needed (9/10/2024 11:56 AM)  Is the patient taking all medications as directed (includes completed medication regime)?: Yes (9/10/2024 11:56 AM)    Appointments  Does the patient have a primary care provider?: Yes (9/10/2024 11:56 AM)  Care Management Interventions: Verified appointment date/time/provider (9/10/2024 11:56 AM)  Has the patient kept scheduled appointments due by today?: Yes (9/10/2024 11:56 AM)  Care Management Interventions: Advised patient to keep appointment (9/10/2024 11:56 AM)    Patient Teaching  Does the patient have access to their discharge instructions?: Yes (9/10/2024 11:56 AM)  Care Management Interventions: Reviewed instructions with patient (9/10/2024 11:56 AM)  What is the patient's perception of their health status since discharge?: Improving (9/10/2024 11:56 AM)      Rae MASONN, RN, [facility]  O: [phone]

## 2024-09-16 ENCOUNTER — APPOINTMENT (OUTPATIENT)
Dept: PRIMARY CARE | Facility: CLINIC | Age: 64
End: 2024-09-16
Payer: COMMERCIAL

## 2024-09-16 VITALS
DIASTOLIC BLOOD PRESSURE: 57 MMHG | WEIGHT: 214 LBS | HEART RATE: 76 BPM | SYSTOLIC BLOOD PRESSURE: 101 MMHG | OXYGEN SATURATION: 96 % | HEIGHT: 70 IN | BODY MASS INDEX: 30.64 KG/M2

## 2024-09-16 DIAGNOSIS — K57.20 DIVERTICULITIS OF COLON WITH PERFORATION: Primary | ICD-10-CM

## 2024-09-16 DIAGNOSIS — Z00.00 HEALTH CARE MAINTENANCE: ICD-10-CM

## 2024-09-16 DIAGNOSIS — Z23 NEED FOR INFLUENZA VACCINATION: ICD-10-CM

## 2024-09-16 DIAGNOSIS — K57.92 DIVERTICULITIS: ICD-10-CM

## 2024-09-16 PROCEDURE — 90471 IMMUNIZATION ADMIN: CPT | Performed by: INTERNAL MEDICINE

## 2024-09-16 PROCEDURE — 90656 IIV3 VACC NO PRSV 0.5 ML IM: CPT | Performed by: INTERNAL MEDICINE

## 2024-09-16 PROCEDURE — 3074F SYST BP LT 130 MM HG: CPT | Performed by: INTERNAL MEDICINE

## 2024-09-16 PROCEDURE — 99396 PREV VISIT EST AGE 40-64: CPT | Performed by: INTERNAL MEDICINE

## 2024-09-16 PROCEDURE — 3078F DIAST BP <80 MM HG: CPT | Performed by: INTERNAL MEDICINE

## 2024-09-16 PROCEDURE — 1036F TOBACCO NON-USER: CPT | Performed by: INTERNAL MEDICINE

## 2024-09-16 PROCEDURE — 3008F BODY MASS INDEX DOCD: CPT | Performed by: INTERNAL MEDICINE

## 2024-09-16 RX ORDER — AMOXICILLIN AND CLAVULANATE POTASSIUM 875; 125 MG/1; MG/1
1 TABLET, FILM COATED ORAL 2 TIMES DAILY
Qty: 14 TABLET | Refills: 0 | Status: SHIPPED | OUTPATIENT
Start: 2024-09-16 | End: 2024-09-23

## 2024-09-16 NOTE — PROGRESS NOTES
"Subjective   Patient ID: Durga Romero is a 64 y.o. male who presents for Annual Exam.    HPI     Review of Systems    Objective   Pulse 76   Ht 1.778 m (5' 10\")   Wt 97.1 kg (214 lb)   SpO2 96%   BMI 30.71 kg/m²     Physical Exam    Assessment/Plan          "

## 2024-09-16 NOTE — PROGRESS NOTES
"Subjective   Patient ID: Durga Romero is a 64 y.o. male who presents for Annual Exam.    He got sudden pain in the left lower abdomen. His bowels are regular. Doing colace, he is still on a low residue diet.     He needs his annual labs  And needs his flu shot    No cp or pressure  He is getting winded more quickly since he was sick  He has been working from home.   He is napping more.   His urine flow has improved  He is almost out of augmentin. Is getting repeat ct by Dr. Garcia, next week. Has 2 days left.     His back pain is better, no pain down the leg  Urine flow is better.          Review of Systems    Objective   /57   Pulse 76   Ht 1.778 m (5' 10\")   Wt 97.1 kg (214 lb)   SpO2 96%   BMI 30.71 kg/m²     Physical Exam  Constitutional:       Appearance: Normal appearance.   Neck:      Vascular: No carotid bruit.   Cardiovascular:      Rate and Rhythm: Normal rate and regular rhythm.      Heart sounds: No murmur heard.  Pulmonary:      Effort: Pulmonary effort is normal.      Breath sounds: Normal breath sounds.   Abdominal:      General: Abdomen is flat. There is no distension.      Palpations: Abdomen is soft. There is no mass.      Tenderness: There is abdominal tenderness (LLQ). There is guarding. There is no rebound.   Musculoskeletal:      Right lower leg: No edema.      Left lower leg: No edema.   Lymphadenopathy:      Cervical: No cervical adenopathy.   Skin:     Coloration: Skin is not jaundiced or pale.   Neurological:      Mental Status: He is alert and oriented to person, place, and time.   Psychiatric:         Mood and Affect: Mood normal.         Behavior: Behavior normal.         Assessment/Plan          Patient Instructions   Physical    Flu shot today    Labs reviewed, good    Diverticulitis with perforation and sepsis, , additional 7 days of augmentin ordered and follow up CT and follow up with Dr. Garcia    Vaccines are current    Recheck in 6 months     Work " wellness forms completed

## 2024-09-16 NOTE — PATIENT INSTRUCTIONS
Physical    Flu shot today    Labs reviewed, good    Diverticulitis with perforation and sepsis, , additional 7 days of augmentin ordered and follow up CT and follow up with Dr. Garcia    Vaccines are current    Recheck in 6 months

## 2024-09-17 PROCEDURE — 90471 IMMUNIZATION ADMIN: CPT | Performed by: INTERNAL MEDICINE

## 2024-09-26 ENCOUNTER — APPOINTMENT (OUTPATIENT)
Dept: RADIOLOGY | Facility: HOSPITAL | Age: 64
End: 2024-09-26
Payer: COMMERCIAL

## 2024-09-26 ENCOUNTER — APPOINTMENT (OUTPATIENT)
Dept: SURGERY | Facility: CLINIC | Age: 64
End: 2024-09-26
Payer: COMMERCIAL

## 2024-10-03 ENCOUNTER — OFFICE VISIT (OUTPATIENT)
Dept: SURGERY | Facility: CLINIC | Age: 64
End: 2024-10-03
Payer: COMMERCIAL

## 2024-10-03 VITALS
HEART RATE: 60 BPM | HEIGHT: 70 IN | TEMPERATURE: 97.3 F | DIASTOLIC BLOOD PRESSURE: 65 MMHG | BODY MASS INDEX: 30.86 KG/M2 | OXYGEN SATURATION: 95 % | WEIGHT: 215.6 LBS | SYSTOLIC BLOOD PRESSURE: 96 MMHG

## 2024-10-03 DIAGNOSIS — K57.20 DIVERTICULITIS OF COLON WITH PERFORATION: Primary | ICD-10-CM

## 2024-10-03 DIAGNOSIS — K57.20 DIVERTICULITIS OF COLON WITH PERFORATION: ICD-10-CM

## 2024-10-03 PROCEDURE — 3078F DIAST BP <80 MM HG: CPT | Performed by: SURGERY

## 2024-10-03 PROCEDURE — 3008F BODY MASS INDEX DOCD: CPT | Performed by: SURGERY

## 2024-10-03 PROCEDURE — 3074F SYST BP LT 130 MM HG: CPT | Performed by: SURGERY

## 2024-10-03 PROCEDURE — 99213 OFFICE O/P EST LOW 20 MIN: CPT | Performed by: SURGERY

## 2024-10-03 RX ORDER — DOCUSATE SODIUM 100 MG/1
100 CAPSULE, LIQUID FILLED ORAL 2 TIMES DAILY
Qty: 60 CAPSULE | Refills: 0 | Status: SHIPPED | OUTPATIENT
Start: 2024-10-03 | End: 2024-11-02

## 2024-10-05 NOTE — PROGRESS NOTES
Subjective   Patient ID: Durga Romero is a 64 y.o. male who presents for Post-op (FUV CT scan ).  HPI  This is a pleasant gentleman allies last month with perforated diverticulitis.  On his initial outpatient visits he still had a lot of pain and had some limitations in his mobility and his energy level.  He states that that has largely resolved and he is for the most part pain-free.  He feels he still can sometimes feel the food and residue as it passes through his GI tract.  He has been taking a stool softener and doing well with that.  No fever or chills.  He has completed all of his antibiotics.  Review of Systems 10 point review is otherwise    Objective he is afebrile today  Physical Exam abdomen is flat soft and completely nontender on exam.    The patient did have a repeat CT scan which showed some improvement.    Assessment/Plan I am recommending that we wait another month and then do a colonoscopy to assess this area and confirm the diagnosis.  At that point in time as long as all is clear I most likely will recommend to the patient taking Benefiber on a daily basis going forward.  If he were to have another complicated attack then he would likely need an operation.           Frida Garcia MD 10/05/24 12:46 PM

## 2024-10-09 ENCOUNTER — PATIENT OUTREACH (OUTPATIENT)
Dept: CARE COORDINATION | Facility: CLINIC | Age: 64
End: 2024-10-09
Payer: COMMERCIAL

## 2024-10-09 NOTE — PROGRESS NOTES
Outreach call to patient to check in 30 days after hospital discharge to support smooth transition of care.  Patient with no additional needs noted. No additional outreach needed at this time.     Rae MASONN, RN, [facility]  O: [phone]

## 2024-10-10 ENCOUNTER — APPOINTMENT (OUTPATIENT)
Dept: SURGERY | Facility: CLINIC | Age: 64
End: 2024-10-10
Payer: COMMERCIAL

## 2024-10-30 ENCOUNTER — TELEPHONE (OUTPATIENT)
Dept: PREADMISSION TESTING | Facility: HOSPITAL | Age: 64
End: 2024-10-30
Payer: COMMERCIAL

## 2024-10-31 ENCOUNTER — ANESTHESIA EVENT (OUTPATIENT)
Dept: OPERATING ROOM | Facility: HOSPITAL | Age: 64
End: 2024-10-31
Payer: COMMERCIAL

## 2024-10-31 RX ORDER — ONDANSETRON HYDROCHLORIDE 2 MG/ML
4 INJECTION, SOLUTION INTRAVENOUS ONCE AS NEEDED
Status: CANCELLED | OUTPATIENT
Start: 2024-10-31

## 2024-10-31 RX ORDER — DROPERIDOL 2.5 MG/ML
0.62 INJECTION, SOLUTION INTRAMUSCULAR; INTRAVENOUS ONCE AS NEEDED
Status: CANCELLED | OUTPATIENT
Start: 2024-10-31

## 2024-11-01 ENCOUNTER — HOSPITAL ENCOUNTER (OUTPATIENT)
Dept: OPERATING ROOM | Facility: HOSPITAL | Age: 64
Setting detail: OUTPATIENT SURGERY
Discharge: HOME | End: 2024-11-01
Payer: COMMERCIAL

## 2024-11-01 ENCOUNTER — ANESTHESIA (OUTPATIENT)
Dept: OPERATING ROOM | Facility: HOSPITAL | Age: 64
End: 2024-11-01
Payer: COMMERCIAL

## 2024-11-01 VITALS
WEIGHT: 216.05 LBS | DIASTOLIC BLOOD PRESSURE: 74 MMHG | HEART RATE: 80 BPM | SYSTOLIC BLOOD PRESSURE: 113 MMHG | RESPIRATION RATE: 16 BRPM | OXYGEN SATURATION: 95 % | TEMPERATURE: 96.8 F | HEIGHT: 70 IN | BODY MASS INDEX: 30.93 KG/M2

## 2024-11-01 DIAGNOSIS — K57.92 DIVERTICULITIS: ICD-10-CM

## 2024-11-01 PROCEDURE — 3700000001 HC GENERAL ANESTHESIA TIME - INITIAL BASE CHARGE: Performed by: ANESTHESIOLOGY

## 2024-11-01 PROCEDURE — 45378 DIAGNOSTIC COLONOSCOPY: CPT | Performed by: SURGERY

## 2024-11-01 PROCEDURE — 3700000002 HC GENERAL ANESTHESIA TIME - EACH INCREMENTAL 1 MINUTE: Performed by: ANESTHESIOLOGY

## 2024-11-01 PROCEDURE — 7100000009 HC PHASE TWO TIME - INITIAL BASE CHARGE: Performed by: ANESTHESIOLOGY

## 2024-11-01 PROCEDURE — 3600000002 HC OR TIME - INITIAL BASE CHARGE - PROCEDURE LEVEL TWO: Performed by: ANESTHESIOLOGY

## 2024-11-01 PROCEDURE — 3600000007 HC OR TIME - EACH INCREMENTAL 1 MINUTE - PROCEDURE LEVEL TWO: Performed by: ANESTHESIOLOGY

## 2024-11-01 PROCEDURE — 7100000010 HC PHASE TWO TIME - EACH INCREMENTAL 1 MINUTE: Performed by: ANESTHESIOLOGY

## 2024-11-01 PROCEDURE — 2500000004 HC RX 250 GENERAL PHARMACY W/ HCPCS (ALT 636 FOR OP/ED): Performed by: NURSE ANESTHETIST, CERTIFIED REGISTERED

## 2024-11-01 RX ORDER — MIDAZOLAM HYDROCHLORIDE 1 MG/ML
INJECTION INTRAMUSCULAR; INTRAVENOUS AS NEEDED
Status: DISCONTINUED | OUTPATIENT
Start: 2024-11-01 | End: 2024-11-01

## 2024-11-01 RX ORDER — PROPOFOL 10 MG/ML
INJECTION, EMULSION INTRAVENOUS AS NEEDED
Status: DISCONTINUED | OUTPATIENT
Start: 2024-11-01 | End: 2024-11-01

## 2024-11-01 RX ORDER — LIDOCAINE HYDROCHLORIDE 10 MG/ML
INJECTION, SOLUTION EPIDURAL; INFILTRATION; INTRACAUDAL; PERINEURAL AS NEEDED
Status: DISCONTINUED | OUTPATIENT
Start: 2024-11-01 | End: 2024-11-01

## 2024-11-01 SDOH — HEALTH STABILITY: MENTAL HEALTH: CURRENT SMOKER: 0

## 2024-11-01 ASSESSMENT — COLUMBIA-SUICIDE SEVERITY RATING SCALE - C-SSRS
6. HAVE YOU EVER DONE ANYTHING, STARTED TO DO ANYTHING, OR PREPARED TO DO ANYTHING TO END YOUR LIFE?: NO
2. HAVE YOU ACTUALLY HAD ANY THOUGHTS OF KILLING YOURSELF?: NO
1. IN THE PAST MONTH, HAVE YOU WISHED YOU WERE DEAD OR WISHED YOU COULD GO TO SLEEP AND NOT WAKE UP?: NO

## 2024-11-01 ASSESSMENT — PAIN - FUNCTIONAL ASSESSMENT: PAIN_FUNCTIONAL_ASSESSMENT: 0-10

## 2024-11-01 ASSESSMENT — PAIN SCALES - GENERAL: PAINLEVEL_OUTOF10: 0 - NO PAIN

## 2024-11-11 LAB
LABORATORY COMMENT REPORT: NORMAL
PATH REPORT.FINAL DX SPEC: NORMAL
PATH REPORT.GROSS SPEC: NORMAL
PATH REPORT.RELEVANT HX SPEC: NORMAL
PATH REPORT.TOTAL CANCER: NORMAL

## 2024-12-04 ENCOUNTER — TELEPHONE (OUTPATIENT)
Dept: PREADMISSION TESTING | Facility: HOSPITAL | Age: 64
End: 2024-12-04

## 2024-12-05 ENCOUNTER — ANESTHESIA EVENT (OUTPATIENT)
Dept: OPERATING ROOM | Facility: HOSPITAL | Age: 64
End: 2024-12-05
Payer: COMMERCIAL

## 2024-12-05 RX ORDER — DROPERIDOL 2.5 MG/ML
0.62 INJECTION, SOLUTION INTRAMUSCULAR; INTRAVENOUS ONCE AS NEEDED
Status: CANCELLED | OUTPATIENT
Start: 2024-12-05

## 2024-12-05 RX ORDER — ONDANSETRON HYDROCHLORIDE 2 MG/ML
4 INJECTION, SOLUTION INTRAVENOUS ONCE AS NEEDED
Status: CANCELLED | OUTPATIENT
Start: 2024-12-05

## 2024-12-06 ENCOUNTER — ANESTHESIA (OUTPATIENT)
Dept: OPERATING ROOM | Facility: HOSPITAL | Age: 64
End: 2024-12-06
Payer: COMMERCIAL

## 2024-12-06 ENCOUNTER — HOSPITAL ENCOUNTER (OUTPATIENT)
Dept: OPERATING ROOM | Facility: HOSPITAL | Age: 64
Setting detail: OUTPATIENT SURGERY
Discharge: HOME | End: 2024-12-06
Payer: COMMERCIAL

## 2024-12-06 VITALS
DIASTOLIC BLOOD PRESSURE: 93 MMHG | SYSTOLIC BLOOD PRESSURE: 134 MMHG | WEIGHT: 218.26 LBS | TEMPERATURE: 97.9 F | RESPIRATION RATE: 16 BRPM | OXYGEN SATURATION: 95 % | BODY MASS INDEX: 30.56 KG/M2 | HEIGHT: 71 IN | HEART RATE: 62 BPM

## 2024-12-06 DIAGNOSIS — K21.9 GERD WITHOUT ESOPHAGITIS: ICD-10-CM

## 2024-12-06 PROCEDURE — 2500000004 HC RX 250 GENERAL PHARMACY W/ HCPCS (ALT 636 FOR OP/ED): Performed by: NURSE ANESTHETIST, CERTIFIED REGISTERED

## 2024-12-06 PROCEDURE — 7100000010 HC PHASE TWO TIME - EACH INCREMENTAL 1 MINUTE: Performed by: STUDENT IN AN ORGANIZED HEALTH CARE EDUCATION/TRAINING PROGRAM

## 2024-12-06 PROCEDURE — 43239 EGD BIOPSY SINGLE/MULTIPLE: CPT | Performed by: SURGERY

## 2024-12-06 PROCEDURE — 7100000009 HC PHASE TWO TIME - INITIAL BASE CHARGE: Performed by: STUDENT IN AN ORGANIZED HEALTH CARE EDUCATION/TRAINING PROGRAM

## 2024-12-06 PROCEDURE — 3700000002 HC GENERAL ANESTHESIA TIME - EACH INCREMENTAL 1 MINUTE: Performed by: STUDENT IN AN ORGANIZED HEALTH CARE EDUCATION/TRAINING PROGRAM

## 2024-12-06 PROCEDURE — 3600000007 HC OR TIME - EACH INCREMENTAL 1 MINUTE - PROCEDURE LEVEL TWO: Performed by: STUDENT IN AN ORGANIZED HEALTH CARE EDUCATION/TRAINING PROGRAM

## 2024-12-06 PROCEDURE — 3700000001 HC GENERAL ANESTHESIA TIME - INITIAL BASE CHARGE: Performed by: STUDENT IN AN ORGANIZED HEALTH CARE EDUCATION/TRAINING PROGRAM

## 2024-12-06 PROCEDURE — 3600000002 HC OR TIME - INITIAL BASE CHARGE - PROCEDURE LEVEL TWO: Performed by: STUDENT IN AN ORGANIZED HEALTH CARE EDUCATION/TRAINING PROGRAM

## 2024-12-06 RX ORDER — PANTOPRAZOLE SODIUM 40 MG/1
40 TABLET, DELAYED RELEASE ORAL
Qty: 30 TABLET | Refills: 11 | Status: SHIPPED | OUTPATIENT
Start: 2024-12-06

## 2024-12-06 RX ORDER — FENTANYL CITRATE 50 UG/ML
INJECTION, SOLUTION INTRAMUSCULAR; INTRAVENOUS AS NEEDED
Status: DISCONTINUED | OUTPATIENT
Start: 2024-12-06 | End: 2024-12-06

## 2024-12-06 RX ORDER — MIDAZOLAM HYDROCHLORIDE 1 MG/ML
INJECTION INTRAMUSCULAR; INTRAVENOUS AS NEEDED
Status: DISCONTINUED | OUTPATIENT
Start: 2024-12-06 | End: 2024-12-06

## 2024-12-06 RX ORDER — PROPOFOL 10 MG/ML
INJECTION, EMULSION INTRAVENOUS AS NEEDED
Status: DISCONTINUED | OUTPATIENT
Start: 2024-12-06 | End: 2024-12-06

## 2024-12-06 RX ORDER — LIDOCAINE HYDROCHLORIDE 10 MG/ML
INJECTION, SOLUTION EPIDURAL; INFILTRATION; INTRACAUDAL; PERINEURAL AS NEEDED
Status: DISCONTINUED | OUTPATIENT
Start: 2024-12-06 | End: 2024-12-06

## 2024-12-06 SDOH — HEALTH STABILITY: MENTAL HEALTH: CURRENT SMOKER: 0

## 2024-12-06 ASSESSMENT — PAIN SCALES - GENERAL: PAINLEVEL_OUTOF10: 0 - NO PAIN

## 2024-12-06 ASSESSMENT — PAIN - FUNCTIONAL ASSESSMENT: PAIN_FUNCTIONAL_ASSESSMENT: 0-10

## 2024-12-06 ASSESSMENT — COLUMBIA-SUICIDE SEVERITY RATING SCALE - C-SSRS
2. HAVE YOU ACTUALLY HAD ANY THOUGHTS OF KILLING YOURSELF?: NO
1. IN THE PAST MONTH, HAVE YOU WISHED YOU WERE DEAD OR WISHED YOU COULD GO TO SLEEP AND NOT WAKE UP?: NO
6. HAVE YOU EVER DONE ANYTHING, STARTED TO DO ANYTHING, OR PREPARED TO DO ANYTHING TO END YOUR LIFE?: NO

## 2024-12-06 NOTE — DISCHARGE INSTRUCTIONS

## 2024-12-06 NOTE — ANESTHESIA PREPROCEDURE EVALUATION
Patient: Durga Romero    Procedure Information       Anesthesia Start Date/Time: 12/06/24 0724    Scheduled providers: Frida Garcia MD; Héctor Kidd MD    Procedure: EGD    Location: [facility] OR            Relevant Problems   Cardiac   (+) Atypical chest pain   (+) Benign essential hypertension   (+) Chest pain on breathing   (+) Hyperlipidemia      Neuro   (+) Anxiety   (+) Depression, major, single episode, mild (CMS-HCC)   (+) Lumbar radiculopathy   (+) Peripheral neuropathy   (+) Tarsal tunnel syndrome of right side      GI   (+) GERD (gastroesophageal reflux disease)      /Renal   (+) Enlarged prostate with lower urinary tract symptoms (LUTS)      Musculoskeletal   (+) Chronic bilateral low back pain without sciatica      HEENT   (+) Sinusitis      ID   (+) Tinea cruris      Skin   (+) Dyshidrotic eczema       Clinical information reviewed:   Tobacco  Allergies  Meds  Problems  Med Hx  Surg Hx   Fam Hx  Soc   Hx        NPO Detail:  NPO/Void Status  Date of Last Liquid: 12/06/24  Time of Last Liquid: 0000  Date of Last Solid: 12/06/24  Time of Last Solid: 0000         Physical Exam    Airway  Mallampati: III  TM distance: >3 FB     Cardiovascular - normal exam     Dental - normal exam     Pulmonary - normal exam     Abdominal - normal exam             Anesthesia Plan    History of general anesthesia?: yes  History of complications of general anesthesia?: no    ASA 2     MAC     The patient is not a current smoker.    intravenous induction   Anesthetic plan and risks discussed with patient.  Use of blood products discussed with who consented to blood products.    Plan discussed with CRNA.

## 2024-12-06 NOTE — ANESTHESIA POSTPROCEDURE EVALUATION
Patient: Durga Romero    Procedure Summary       Date: 12/06/24 Room / Location: [facility] OR    Anesthesia Start: 0724 Anesthesia Stop: 0747    Procedure: EGD Diagnosis: GERD without esophagitis    Scheduled Providers: Frida Garcia MD; Héctor Kidd MD Responsible Provider: Héctor Kidd MD    Anesthesia Type: MAC ASA Status: 2            Anesthesia Type: MAC    Vitals Value Taken Time   /83 12/06/24 0750   Temp 36.6 °C (97.9 °F) 12/06/24 0750   Pulse 57 12/06/24 0750   Resp 16 12/06/24 0750   SpO2 97 % 12/06/24 0750       Anesthesia Post Evaluation    Patient location during evaluation: PACU  Patient participation: complete - patient participated  Level of consciousness: awake and alert  Pain management: adequate  Multimodal analgesia pain management approach  Airway patency: patent  Cardiovascular status: acceptable  Respiratory status: acceptable  Hydration status: acceptable  Postoperative Nausea and Vomiting: none        No notable events documented.    
intubated

## 2024-12-06 NOTE — H&P
History Of Present Illness  Durga Romero is a 64 y.o. male presenting with dysphagia.     Past Medical History  Past Medical History:   Diagnosis Date    Chronic maxillary sinusitis 12/18/2019    Left maxillary sinusitis    Contact with and (suspected) exposure to other viral communicable diseases 03/21/2020    Exposure to influenza    Diverticulitis     Dizziness and giddiness 01/14/2015    Lightheadedness    HTN (hypertension)     Myalgia, other site 06/26/2019    Buttock pain    Noninfective gastroenteritis and colitis, unspecified 05/23/2016    Chronic diarrhea of unknown origin    Other forms of dyspnea 02/15/2019    Dyspnea on exertion    Other headache syndrome 01/14/2015    Chronic mixed headache syndrome    Other intervertebral disc displacement, lumbar region 12/11/2017    Lumbar herniated disc    Other microscopic hematuria 06/11/2020    Other microscopic hematuria    Pain in right ankle and joints of right foot 05/23/2016    Arthralgia of right foot    Personal history of other diseases of the musculoskeletal system and connective tissue 12/04/2017    History of low back pain    Personal history of other diseases of the nervous system and sense organs 10/07/2019    History of blurred vision    Personal history of other diseases of the nervous system and sense organs 11/21/2016    History of cataract    Personal history of other diseases of the respiratory system 10/07/2015    History of pharyngitis    Personal history of other specified conditions 06/09/2016    History of epigastric pain    Personal history of other specified conditions 02/16/2015    History of urinary frequency    Personal history of other specified conditions 01/26/2015    History of urinary retention    Personal history of other specified conditions 01/14/2015    History of memory loss    Personal history of other specified conditions 01/29/2020    History of vertigo    Radiculopathy, lumbar region 12/11/2017    Lumbar radiculitis  "   Strain of muscle and tendon of back wall of thorax, initial encounter 07/22/2014    Strain of thoracic spine    Syncope and collapse 10/09/2019    Near syncope       Surgical History  Past Surgical History:   Procedure Laterality Date    COLONOSCOPY      11/1/24    HERNIA REPAIR  06/21/2013    Hernia Repair Inguinal Sliding        Social History  He reports that he has never smoked. He has never used smokeless tobacco. He reports that he does not currently use alcohol. He reports that he does not use drugs.    Family History  No family history on file.     Allergies  Patient has no known allergies.    Review of Systems   neg  Physical Examlungs clear  Heart RRR     Last Recorded Vitals  Blood pressure 129/88, pulse 63, temperature 36 °C (96.8 °F), resp. rate 16, height 1.803 m (5' 11\"), weight 99 kg (218 lb 4.1 oz), SpO2 96%.    Relevant Results             Assessment/Plan   Assessment & Plan  GERD without esophagitis             I spent 10 minutes in the professional and overall care of this patient.      Frida Garcia MD    "

## 2024-12-20 LAB
LAB AP ASR DISCLAIMER: NORMAL
LABORATORY COMMENT REPORT: NORMAL
PATH REPORT.FINAL DX SPEC: NORMAL
PATH REPORT.GROSS SPEC: NORMAL
PATH REPORT.RELEVANT HX SPEC: NORMAL
PATH REPORT.TOTAL CANCER: NORMAL

## 2024-12-26 DIAGNOSIS — I10 BENIGN ESSENTIAL HYPERTENSION: ICD-10-CM

## 2024-12-27 RX ORDER — OLMESARTAN MEDOXOMIL 20 MG/1
20 TABLET ORAL DAILY
Qty: 90 TABLET | Refills: 0 | Status: SHIPPED | OUTPATIENT
Start: 2024-12-27 | End: 2025-03-27

## 2025-02-03 DIAGNOSIS — I10 BENIGN ESSENTIAL HYPERTENSION: ICD-10-CM

## 2025-02-03 DIAGNOSIS — F41.9 ANXIETY: ICD-10-CM

## 2025-02-03 DIAGNOSIS — K21.9 GASTROESOPHAGEAL REFLUX DISEASE, UNSPECIFIED WHETHER ESOPHAGITIS PRESENT: ICD-10-CM

## 2025-02-03 DIAGNOSIS — F32.1 CURRENT MODERATE EPISODE OF MAJOR DEPRESSIVE DISORDER WITHOUT PRIOR EPISODE (MULTI): ICD-10-CM

## 2025-02-03 DIAGNOSIS — E78.5 HYPERLIPIDEMIA, UNSPECIFIED HYPERLIPIDEMIA TYPE: ICD-10-CM

## 2025-02-03 DIAGNOSIS — S39.012A STRAIN OF LUMBAR REGION, INITIAL ENCOUNTER: ICD-10-CM

## 2025-02-03 RX ORDER — CARVEDILOL 12.5 MG/1
12.5 TABLET ORAL
Qty: 180 TABLET | Refills: 3 | OUTPATIENT
Start: 2025-02-03

## 2025-02-03 RX ORDER — BUPROPION HYDROCHLORIDE 300 MG/1
300 TABLET ORAL DAILY
Qty: 90 TABLET | Refills: 3 | OUTPATIENT
Start: 2025-02-03

## 2025-02-03 RX ORDER — ROSUVASTATIN CALCIUM 10 MG/1
10 TABLET, COATED ORAL DAILY
Qty: 90 TABLET | Refills: 3 | OUTPATIENT
Start: 2025-02-03

## 2025-02-03 RX ORDER — ALLOPURINOL 300 MG/1
300 TABLET ORAL DAILY
Qty: 90 TABLET | Refills: 3 | OUTPATIENT
Start: 2025-02-03

## 2025-02-04 DIAGNOSIS — F41.9 ANXIETY: ICD-10-CM

## 2025-02-04 DIAGNOSIS — F32.1 CURRENT MODERATE EPISODE OF MAJOR DEPRESSIVE DISORDER WITHOUT PRIOR EPISODE (MULTI): ICD-10-CM

## 2025-02-04 DIAGNOSIS — I10 BENIGN ESSENTIAL HYPERTENSION: ICD-10-CM

## 2025-02-04 DIAGNOSIS — E78.5 HYPERLIPIDEMIA, UNSPECIFIED HYPERLIPIDEMIA TYPE: ICD-10-CM

## 2025-02-04 DIAGNOSIS — K21.9 GASTROESOPHAGEAL REFLUX DISEASE, UNSPECIFIED WHETHER ESOPHAGITIS PRESENT: ICD-10-CM

## 2025-02-04 DIAGNOSIS — S39.012A STRAIN OF LUMBAR REGION, INITIAL ENCOUNTER: ICD-10-CM

## 2025-02-05 RX ORDER — CARVEDILOL 12.5 MG/1
12.5 TABLET ORAL
Qty: 180 TABLET | Refills: 0 | Status: SHIPPED | OUTPATIENT
Start: 2025-02-05

## 2025-02-05 RX ORDER — ALLOPURINOL 300 MG/1
300 TABLET ORAL DAILY
Qty: 90 TABLET | Refills: 0 | Status: SHIPPED | OUTPATIENT
Start: 2025-02-05

## 2025-02-05 RX ORDER — ROSUVASTATIN CALCIUM 10 MG/1
10 TABLET, COATED ORAL DAILY
Qty: 90 TABLET | Refills: 0 | Status: SHIPPED | OUTPATIENT
Start: 2025-02-05

## 2025-02-05 RX ORDER — BUPROPION HYDROCHLORIDE 300 MG/1
300 TABLET ORAL DAILY
Qty: 90 TABLET | Refills: 0 | Status: SHIPPED | OUTPATIENT
Start: 2025-02-05

## 2025-03-10 DIAGNOSIS — I10 BENIGN ESSENTIAL HYPERTENSION: ICD-10-CM

## 2025-03-10 RX ORDER — OLMESARTAN MEDOXOMIL 20 MG/1
20 TABLET ORAL DAILY
Qty: 90 TABLET | Refills: 0 | Status: SHIPPED | OUTPATIENT
Start: 2025-03-10

## 2025-05-02 DIAGNOSIS — S39.012A STRAIN OF LUMBAR REGION, INITIAL ENCOUNTER: ICD-10-CM

## 2025-05-02 DIAGNOSIS — E78.5 HYPERLIPIDEMIA, UNSPECIFIED HYPERLIPIDEMIA TYPE: ICD-10-CM

## 2025-05-02 DIAGNOSIS — F32.1 CURRENT MODERATE EPISODE OF MAJOR DEPRESSIVE DISORDER WITHOUT PRIOR EPISODE (MULTI): ICD-10-CM

## 2025-05-02 DIAGNOSIS — K21.9 GASTROESOPHAGEAL REFLUX DISEASE, UNSPECIFIED WHETHER ESOPHAGITIS PRESENT: ICD-10-CM

## 2025-05-02 DIAGNOSIS — I10 BENIGN ESSENTIAL HYPERTENSION: ICD-10-CM

## 2025-05-02 DIAGNOSIS — F41.9 ANXIETY: ICD-10-CM

## 2025-05-04 RX ORDER — CARVEDILOL 12.5 MG/1
TABLET ORAL
Qty: 30 TABLET | Refills: 0 | Status: SHIPPED | OUTPATIENT
Start: 2025-05-04

## 2025-05-04 RX ORDER — ALLOPURINOL 300 MG/1
300 TABLET ORAL DAILY
Qty: 30 TABLET | Refills: 0 | Status: SHIPPED | OUTPATIENT
Start: 2025-05-04

## 2025-05-04 RX ORDER — BUPROPION HYDROCHLORIDE 300 MG/1
300 TABLET ORAL DAILY
Qty: 30 TABLET | Refills: 0 | Status: SHIPPED | OUTPATIENT
Start: 2025-05-04

## 2025-05-04 RX ORDER — ROSUVASTATIN CALCIUM 10 MG/1
10 TABLET, COATED ORAL DAILY
Qty: 30 TABLET | Refills: 0 | Status: SHIPPED | OUTPATIENT
Start: 2025-05-04

## 2025-05-23 PROBLEM — N40.1 BENIGN PROSTATIC HYPERPLASIA WITH URINARY OBSTRUCTION: Status: ACTIVE | Noted: 2025-05-23

## 2025-05-23 PROBLEM — D18.01 HEMANGIOMA OF SKIN AND SUBCUTANEOUS TISSUE: Status: ACTIVE | Noted: 2020-10-28

## 2025-05-23 PROBLEM — L57.8 OTHER SKIN CHANGES DUE TO CHRONIC EXPOSURE TO NONIONIZING RADIATION: Status: ACTIVE | Noted: 2017-11-06

## 2025-05-23 PROBLEM — L28.1 PRURIGO NODULARIS: Status: ACTIVE | Noted: 2017-11-06

## 2025-05-23 PROBLEM — R20.2 PARESTHESIA: Status: ACTIVE | Noted: 2025-05-23

## 2025-05-23 PROBLEM — J02.9 SORE THROAT: Status: RESOLVED | Noted: 2025-05-23 | Resolved: 2025-05-23

## 2025-05-23 PROBLEM — J32.9 SINUSITIS: Status: RESOLVED | Noted: 2023-04-17 | Resolved: 2025-05-23

## 2025-05-23 PROBLEM — L71.8 OTHER ROSACEA: Status: ACTIVE | Noted: 2019-08-28

## 2025-05-23 PROBLEM — G56.02 CARPAL TUNNEL SYNDROME OF LEFT WRIST: Status: ACTIVE | Noted: 2025-05-23

## 2025-05-23 PROBLEM — L82.0 INFLAMED SEBORRHEIC KERATOSIS: Status: ACTIVE | Noted: 2020-09-14

## 2025-05-23 PROBLEM — I83.93 ASYMPTOMATIC VARICOSE VEINS OF BILATERAL LOWER EXTREMITIES: Status: ACTIVE | Noted: 2020-10-28

## 2025-05-23 PROBLEM — L57.0 ACTINIC KERATOSIS: Status: ACTIVE | Noted: 2017-11-06

## 2025-05-23 PROBLEM — M94.0 COSTOCHONDRITIS: Status: RESOLVED | Noted: 2023-04-17 | Resolved: 2025-05-23

## 2025-05-23 PROBLEM — F32.A DEPRESSIVE DISORDER: Status: ACTIVE | Noted: 2025-05-23

## 2025-05-23 PROBLEM — G56.22 CUBITAL TUNNEL SYNDROME ON LEFT: Status: ACTIVE | Noted: 2025-05-23

## 2025-05-23 PROBLEM — L72.0 EPIDERMAL CYST: Status: ACTIVE | Noted: 2017-11-06

## 2025-05-23 PROBLEM — L81.4 OTHER MELANIN HYPERPIGMENTATION: Status: ACTIVE | Noted: 2017-11-06

## 2025-05-23 PROBLEM — K57.92 ACUTE DIVERTICULITIS: Status: RESOLVED | Noted: 2024-09-03 | Resolved: 2025-05-23

## 2025-05-23 PROBLEM — N13.8 BENIGN PROSTATIC HYPERPLASIA WITH URINARY OBSTRUCTION: Status: ACTIVE | Noted: 2025-05-23

## 2025-05-23 PROBLEM — D23.5 OTHER BENIGN NEOPLASM OF SKIN OF TRUNK: Status: ACTIVE | Noted: 2017-11-06

## 2025-05-23 PROBLEM — D22.5 MELANOCYTIC NEVI OF TRUNK: Status: ACTIVE | Noted: 2019-08-28

## 2025-05-23 PROBLEM — L30.1 VESICULAR ECZEMA: Status: ACTIVE | Noted: 2025-05-23

## 2025-05-23 PROBLEM — D48.5 NEOPLASM OF UNCERTAIN BEHAVIOR OF SKIN: Status: ACTIVE | Noted: 2020-10-28

## 2025-05-27 ENCOUNTER — TELEPHONE (OUTPATIENT)
Dept: PRIMARY CARE | Facility: CLINIC | Age: 65
End: 2025-05-27
Payer: COMMERCIAL

## 2025-05-27 NOTE — TELEPHONE ENCOUNTER
**DR MCNEAL ONLY SENT 30 DAY SUPPLY WHICH [facility] DENIED** PT IS RUNNING OUT OF MEDICATIONS     Rx Refill Request Telephone Encounter    Name:  Durga Romero  :  [id number]  Medication Name:    ROSUVASTATIN 10MG Q/D 90 DAY   COREG 12.5MG BID 90 DAY   WELLBUTRIN 300MG Q/D 90 DAY   ALLOPURINOL 300MG Q/D 90 DAY   Specific Pharmacy location:  [facility]   Date of last appointment:  2024 KARINE   Date of next appointment:  2025 BALDOMERO Colin number to reach patient:  [phone]

## 2025-05-29 ENCOUNTER — APPOINTMENT (OUTPATIENT)
Dept: PRIMARY CARE | Facility: CLINIC | Age: 65
End: 2025-05-29
Payer: COMMERCIAL

## 2025-05-29 VITALS
SYSTOLIC BLOOD PRESSURE: 124 MMHG | DIASTOLIC BLOOD PRESSURE: 78 MMHG | WEIGHT: 226.2 LBS | BODY MASS INDEX: 31.67 KG/M2 | HEART RATE: 59 BPM | OXYGEN SATURATION: 96 % | HEIGHT: 71 IN

## 2025-05-29 DIAGNOSIS — Z13.29 SCREENING FOR THYROID DISORDER: ICD-10-CM

## 2025-05-29 DIAGNOSIS — M10.9 GOUT OF MULTIPLE SITES, UNSPECIFIED CAUSE, UNSPECIFIED CHRONICITY: ICD-10-CM

## 2025-05-29 DIAGNOSIS — Z13.6 ENCOUNTER FOR LIPID SCREENING FOR CARDIOVASCULAR DISEASE: ICD-10-CM

## 2025-05-29 DIAGNOSIS — F41.9 ANXIETY: ICD-10-CM

## 2025-05-29 DIAGNOSIS — E55.9 VITAMIN D DEFICIENCY: ICD-10-CM

## 2025-05-29 DIAGNOSIS — D48.5 NEOPLASM OF UNCERTAIN BEHAVIOR OF SKIN: ICD-10-CM

## 2025-05-29 DIAGNOSIS — E78.5 HYPERLIPIDEMIA, UNSPECIFIED HYPERLIPIDEMIA TYPE: ICD-10-CM

## 2025-05-29 DIAGNOSIS — Z12.5 SCREENING FOR PROSTATE CANCER: ICD-10-CM

## 2025-05-29 DIAGNOSIS — S39.012A STRAIN OF LUMBAR REGION, INITIAL ENCOUNTER: ICD-10-CM

## 2025-05-29 DIAGNOSIS — Z13.21 ENCOUNTER FOR VITAMIN DEFICIENCY SCREENING: ICD-10-CM

## 2025-05-29 DIAGNOSIS — I10 BENIGN ESSENTIAL HYPERTENSION: ICD-10-CM

## 2025-05-29 DIAGNOSIS — Z00.00 ROUTINE ADULT HEALTH MAINTENANCE: Primary | ICD-10-CM

## 2025-05-29 DIAGNOSIS — F32.1 CURRENT MODERATE EPISODE OF MAJOR DEPRESSIVE DISORDER WITHOUT PRIOR EPISODE (MULTI): ICD-10-CM

## 2025-05-29 DIAGNOSIS — Z13.220 SCREENING FOR CHOLESTEROL LEVEL: ICD-10-CM

## 2025-05-29 DIAGNOSIS — N52.9 ERECTILE DYSFUNCTION, UNSPECIFIED ERECTILE DYSFUNCTION TYPE: ICD-10-CM

## 2025-05-29 DIAGNOSIS — Z13.220 ENCOUNTER FOR LIPID SCREENING FOR CARDIOVASCULAR DISEASE: ICD-10-CM

## 2025-05-29 DIAGNOSIS — Z23 NEED FOR VACCINATION: ICD-10-CM

## 2025-05-29 DIAGNOSIS — Z79.899 ON LONG TERM DRUG THERAPY: ICD-10-CM

## 2025-05-29 DIAGNOSIS — Z13.1 SCREENING FOR DIABETES MELLITUS: ICD-10-CM

## 2025-05-29 DIAGNOSIS — R73.09 BLOOD GLUCOSE ABNORMAL: ICD-10-CM

## 2025-05-29 DIAGNOSIS — K21.9 GASTROESOPHAGEAL REFLUX DISEASE, UNSPECIFIED WHETHER ESOPHAGITIS PRESENT: ICD-10-CM

## 2025-05-29 PROBLEM — J34.3 HYPERTROPHY OF INFERIOR NASAL TURBINATE: Status: RESOLVED | Noted: 2023-04-17 | Resolved: 2025-05-29

## 2025-05-29 PROBLEM — G56.22 CUBITAL TUNNEL SYNDROME ON LEFT: Status: RESOLVED | Noted: 2025-05-23 | Resolved: 2025-05-29

## 2025-05-29 PROBLEM — S29.019A THORACIC MYOFASCIAL STRAIN: Status: RESOLVED | Noted: 2023-04-17 | Resolved: 2025-05-29

## 2025-05-29 PROBLEM — M79.674 PAIN OF TOE OF RIGHT FOOT: Status: RESOLVED | Noted: 2023-04-17 | Resolved: 2025-05-29

## 2025-05-29 PROBLEM — L57.0 ACTINIC KERATOSIS: Status: RESOLVED | Noted: 2017-11-06 | Resolved: 2025-05-29

## 2025-05-29 PROBLEM — M54.50 CHRONIC BILATERAL LOW BACK PAIN WITHOUT SCIATICA: Status: RESOLVED | Noted: 2023-04-17 | Resolved: 2025-05-29

## 2025-05-29 PROBLEM — I83.93 ASYMPTOMATIC VARICOSE VEINS OF BILATERAL LOWER EXTREMITIES: Status: RESOLVED | Noted: 2020-10-28 | Resolved: 2025-05-29

## 2025-05-29 PROBLEM — M65.30 TRIGGER FINGER, ACQUIRED: Status: RESOLVED | Noted: 2023-04-17 | Resolved: 2025-05-29

## 2025-05-29 PROBLEM — R20.0 NUMBNESS: Status: RESOLVED | Noted: 2023-04-17 | Resolved: 2025-05-29

## 2025-05-29 PROBLEM — L57.8 OTHER SKIN CHANGES DUE TO CHRONIC EXPOSURE TO NONIONIZING RADIATION: Status: RESOLVED | Noted: 2017-11-06 | Resolved: 2025-05-29

## 2025-05-29 PROBLEM — M79.605 PAIN OF LEFT LOWER EXTREMITY: Status: RESOLVED | Noted: 2023-04-17 | Resolved: 2025-05-29

## 2025-05-29 PROBLEM — N45.1 EPIDIDYMITIS: Status: RESOLVED | Noted: 2023-04-17 | Resolved: 2025-05-29

## 2025-05-29 PROBLEM — R05.9 COUGH: Status: RESOLVED | Noted: 2023-04-17 | Resolved: 2025-05-29

## 2025-05-29 PROBLEM — M25.512 LEFT SHOULDER PAIN: Status: RESOLVED | Noted: 2023-04-17 | Resolved: 2025-05-29

## 2025-05-29 PROBLEM — R73.01 IMPAIRED FASTING GLUCOSE: Status: RESOLVED | Noted: 2023-04-17 | Resolved: 2025-05-29

## 2025-05-29 PROBLEM — N40.1 ENLARGED PROSTATE WITH LOWER URINARY TRACT SYMPTOMS (LUTS): Status: RESOLVED | Noted: 2023-04-17 | Resolved: 2025-05-29

## 2025-05-29 PROBLEM — M79.604 PAIN OF RIGHT LOWER EXTREMITY: Status: RESOLVED | Noted: 2023-04-17 | Resolved: 2025-05-29

## 2025-05-29 PROBLEM — G89.29 CHRONIC BILATERAL LOW BACK PAIN WITHOUT SCIATICA: Status: RESOLVED | Noted: 2023-04-17 | Resolved: 2025-05-29

## 2025-05-29 PROBLEM — R07.1 CHEST PAIN ON BREATHING: Status: RESOLVED | Noted: 2023-04-17 | Resolved: 2025-05-29

## 2025-05-29 PROBLEM — H26.9 LEFT CATARACT: Status: RESOLVED | Noted: 2023-04-17 | Resolved: 2025-05-29

## 2025-05-29 PROCEDURE — 3008F BODY MASS INDEX DOCD: CPT | Performed by: NURSE PRACTITIONER

## 2025-05-29 PROCEDURE — 90677 PCV20 VACCINE IM: CPT | Performed by: NURSE PRACTITIONER

## 2025-05-29 PROCEDURE — 99214 OFFICE O/P EST MOD 30 MIN: CPT | Performed by: NURSE PRACTITIONER

## 2025-05-29 PROCEDURE — 90471 IMMUNIZATION ADMIN: CPT | Performed by: NURSE PRACTITIONER

## 2025-05-29 PROCEDURE — 3078F DIAST BP <80 MM HG: CPT | Performed by: NURSE PRACTITIONER

## 2025-05-29 PROCEDURE — 3074F SYST BP LT 130 MM HG: CPT | Performed by: NURSE PRACTITIONER

## 2025-05-29 RX ORDER — ROSUVASTATIN CALCIUM 10 MG/1
10 TABLET, COATED ORAL DAILY
Qty: 90 TABLET | Refills: 2 | Status: SHIPPED | OUTPATIENT
Start: 2025-05-29

## 2025-05-29 RX ORDER — ALLOPURINOL 300 MG/1
300 TABLET ORAL DAILY
Qty: 90 TABLET | Refills: 2 | Status: SHIPPED | OUTPATIENT
Start: 2025-05-29

## 2025-05-29 RX ORDER — SILDENAFIL 100 MG/1
100 TABLET, FILM COATED ORAL AS NEEDED
Qty: 10 TABLET | Refills: 0 | Status: SHIPPED | OUTPATIENT
Start: 2025-05-29

## 2025-05-29 RX ORDER — CARVEDILOL 12.5 MG/1
12.5 TABLET ORAL 2 TIMES DAILY
Qty: 180 TABLET | Refills: 2 | Status: SHIPPED | OUTPATIENT
Start: 2025-05-29 | End: 2025-05-30 | Stop reason: SDUPTHER

## 2025-05-29 RX ORDER — BUPROPION HYDROCHLORIDE 300 MG/1
300 TABLET ORAL DAILY
Qty: 90 TABLET | Refills: 2 | Status: SHIPPED | OUTPATIENT
Start: 2025-05-29

## 2025-05-29 ASSESSMENT — ENCOUNTER SYMPTOMS
BRUISES/BLEEDS EASILY: 0
SHORTNESS OF BREATH: 0
HEADACHES: 0
SEIZURES: 0
ABDOMINAL PAIN: 0
NAUSEA: 0
PALPITATIONS: 0
WEAKNESS: 0
TROUBLE SWALLOWING: 0
EYE PAIN: 0
FEVER: 0
WHEEZING: 0
DIFFICULTY URINATING: 0
MYALGIAS: 0
BACK PAIN: 0
COLOR CHANGE: 0
ABDOMINAL DISTENTION: 0
DIARRHEA: 0
JOINT SWELLING: 0
COUGH: 0
WOUND: 0
ADENOPATHY: 0
CONSTIPATION: 0
CHILLS: 0
DIZZINESS: 0
FATIGUE: 0

## 2025-05-29 NOTE — ASSESSMENT & PLAN NOTE
Patient continues on daily statin therapy.  Will continue to monitor fasting cholesterol level routinely for surveillance purposes

## 2025-05-29 NOTE — PROGRESS NOTES
Subjective   Patient ID: Durga Romero is a 64 y.o. male who presents for Follow-up (Med review).    Patient seen today in order to establish primary care.  Patient continues to work full-time at a local [employer] company.  He states that he typically works from home but will occasionally go into the office for socialization.  He lives with his spouse and helps to become more active when the weather improves.  He states that he will go for walks or biking.  No reported issues with shortness of breath or chest pain upon exertion.  Patient does not monitor his vital signs routinely at home but denies any issues with chest pain/pressure, headaches, blurred vision or other cardiac concerns.  He does not smoke or drink alcohol.  No reported issues with appetite, staying hydrated, bowel or bladder.  Discussed patient's relatively recent history of perforated diverticulum.  No persistent GI concerns reported at this time.  Patient is now following routinely with dermatology due to history of precancerous skin lesions.  No acute concerns with vision or dentition.  Patient voices stabilize mood and no significant insomnia.  Good support system overall.  Medications reviewed.  No other acute issues at this time.           Medications Ordered Prior to Encounter[1]    Medical History[2]     Surgical History[3]     Family History[4]     Review of Systems   Constitutional:  Negative for chills, fatigue and fever.   HENT:  Negative for dental problem and trouble swallowing.    Eyes:  Negative for pain and visual disturbance.        History of cataract removals   Respiratory:  Negative for cough, shortness of breath and wheezing.    Cardiovascular:  Negative for chest pain, palpitations and leg swelling.   Gastrointestinal:  Negative for abdominal distention, abdominal pain, constipation, diarrhea and nausea.   Endocrine: Negative for cold intolerance and heat intolerance.   Genitourinary:  Negative for difficulty urinating.  "  Musculoskeletal:  Negative for back pain, gait problem, joint swelling and myalgias.   Skin:  Negative for color change, pallor, rash and wound.        History of precancerous skin lesions   Allergic/Immunologic: Negative for environmental allergies and food allergies.   Neurological:  Negative for dizziness, seizures, weakness and headaches.   Hematological:  Negative for adenopathy. Does not bruise/bleed easily.   Psychiatric/Behavioral:  Negative for behavioral problems.         History of anxiety and depression, currently well-controlled   All other systems reviewed and are negative.      Objective   /78   Pulse 59   Ht 1.803 m (5' 11\")   Wt 103 kg (226 lb 3.2 oz)   SpO2 96%   BMI 31.55 kg/m²     Physical Exam  Constitutional:       General: He is not in acute distress.     Appearance: Normal appearance. He is not toxic-appearing.   HENT:      Head: Normocephalic and atraumatic.      Right Ear: Tympanic membrane, ear canal and external ear normal.      Left Ear: Tympanic membrane, ear canal and external ear normal.      Nose: Nose normal.      Mouth/Throat:      Mouth: Mucous membranes are moist.      Pharynx: Oropharynx is clear.   Eyes:      Extraocular Movements: Extraocular movements intact.      Conjunctiva/sclera: Conjunctivae normal.      Pupils: Pupils are equal, round, and reactive to light.   Cardiovascular:      Rate and Rhythm: Normal rate and regular rhythm.      Pulses: Normal pulses.      Heart sounds: Normal heart sounds. No murmur heard.  Pulmonary:      Effort: Pulmonary effort is normal.      Breath sounds: Normal breath sounds. No wheezing.   Abdominal:      General: Bowel sounds are normal.      Palpations: Abdomen is soft.   Musculoskeletal:         General: No swelling.      Cervical back: Normal range of motion and neck supple.   Skin:     General: Skin is warm and dry.   Neurological:      General: No focal deficit present.      Mental Status: He is alert and oriented to " person, place, and time. Mental status is at baseline.      Cranial Nerves: No cranial nerve deficit.      Motor: No weakness.   Psychiatric:         Mood and Affect: Mood normal.         Behavior: Behavior normal.         Thought Content: Thought content normal.         Judgment: Judgment normal.         Assessment/Plan   Problem List Items Addressed This Visit           ICD-10-CM    Anxiety F41.9    Mood reportedly stable on current Wellbutrin dosing.  Good support system reported overall.  Provider to be notified for any persistent/worsening mood concerns.         Relevant Medications    allopurinol (Zyloprim) 300 mg tablet    buPROPion XL (Wellbutrin XL) 300 mg 24 hr tablet    rosuvastatin (Crestor) 10 mg tablet    Benign essential hypertension I10    Appears stable on current medication regiment.  Will continue to monitor patient's vital signs routinely for surveillance purposes.  Patient to notify office for any new cardiac concerns         Relevant Medications    allopurinol (Zyloprim) 300 mg tablet    buPROPion XL (Wellbutrin XL) 300 mg 24 hr tablet    rosuvastatin (Crestor) 10 mg tablet    Other Relevant Orders    Comprehensive Metabolic Panel (Completed)    CBC and Auto Differential (Completed)    Erectile dysfunction N52.9    Relevant Medications    sildenafil (Viagra) 100 mg tablet    GERD (gastroesophageal reflux disease) K21.9    Relevant Medications    allopurinol (Zyloprim) 300 mg tablet    buPROPion XL (Wellbutrin XL) 300 mg 24 hr tablet    rosuvastatin (Crestor) 10 mg tablet    Gout M10.9    Reportedly stable on current allopurinol dosing.  Provider to be notified for any new/worsening gouty flares         Relevant Orders    Uric Acid (Completed)    Hyperlipidemia E78.5    Patient continues on daily statin therapy.  Will continue to monitor fasting cholesterol level routinely for surveillance purposes         Relevant Medications    allopurinol (Zyloprim) 300 mg tablet    buPROPion XL (Wellbutrin  XL) 300 mg 24 hr tablet    rosuvastatin (Crestor) 10 mg tablet    Neoplasm of uncertain behavior of skin D48.5    Patient to maintain routine follow-up with dermatology for continued evaluation purposes due to history of precancerous skin lesion         Routine adult health maintenance - Primary Z00.00    Routine blood work ordered today for surveillance purposes.  Will continue to monitor as appropriate.    -Most recent colonoscopy completed in 2024 with recommendations for repeat screening in 5 years (10/31/2029)         Relevant Orders    Comprehensive Metabolic Panel (Completed)    TSH with reflex to Free T4 if abnormal (Completed)    Lipid Panel (Completed)    Vitamin D 25-Hydroxy,Total (for eval of Vitamin D levels) (Completed)    CBC and Auto Differential (Completed)    Prostate Specific Antigen (Completed)     Other Visit Diagnoses         Codes      Current moderate episode of major depressive disorder without prior episode (Multi)     F32.1    Relevant Medications    allopurinol (Zyloprim) 300 mg tablet    buPROPion XL (Wellbutrin XL) 300 mg 24 hr tablet    rosuvastatin (Crestor) 10 mg tablet      Strain of lumbar region, initial encounter     S39.012A    Relevant Medications    allopurinol (Zyloprim) 300 mg tablet    buPROPion XL (Wellbutrin XL) 300 mg 24 hr tablet    rosuvastatin (Crestor) 10 mg tablet      Need for vaccination     Z23    Relevant Orders    Pneumococcal conjugate vaccine, 20-valent (PREVNAR 20) (Completed)      Screening for diabetes mellitus     Z13.1    Relevant Orders    Hemoglobin A1C (Completed)      Blood glucose abnormal     R73.09    Relevant Orders    Hemoglobin A1C (Completed)      Screening for thyroid disorder     Z13.29    Relevant Orders    TSH with reflex to Free T4 if abnormal (Completed)      Screening for cholesterol level     Z13.220    Relevant Orders    Lipid Panel (Completed)      Encounter for lipid screening for cardiovascular disease     Z13.220, Z13.6     Relevant Orders    Lipid Panel (Completed)      Encounter for vitamin deficiency screening     Z13.21    Relevant Orders    Vitamin D 25-Hydroxy,Total (for eval of Vitamin D levels) (Completed)      On long term drug therapy     Z79.899    Relevant Orders    Vitamin D 25-Hydroxy,Total (for eval of Vitamin D levels) (Completed)      Vitamin D deficiency     E55.9    Relevant Orders    Vitamin D 25-Hydroxy,Total (for eval of Vitamin D levels) (Completed)      Screening for prostate cancer     Z12.5    Relevant Orders    Prostate Specific Antigen (Completed)                      [1]   Current Outpatient Medications on File Prior to Visit   Medication Sig Dispense Refill    cholecalciferol, vitamin D3, 10 mcg (400 unit) capsule Take by mouth.      docusate sodium (Colace) 100 mg capsule Take 1 capsule (100 mg) by mouth 2 times a day. (Patient taking differently: Take 1 capsule (100 mg) by mouth 2 times a day as needed for constipation.) 60 capsule 0    [DISCONTINUED] allopurinol (Zyloprim) 300 mg tablet TAKE 1 TABLET ONCE DAILY 30 tablet 0    [DISCONTINUED] buPROPion XL (Wellbutrin XL) 300 mg 24 hr tablet TAKE 1 TABLET ONCE DAILY 30 tablet 0    [DISCONTINUED] carvedilol (Coreg) 12.5 mg tablet TAKE 1 TABLET TWICE A DAY IN THE MORNING AND LATE AFTERNOON 30 tablet 0    [DISCONTINUED] rosuvastatin (Crestor) 10 mg tablet TAKE 1 TABLET DAILY 30 tablet 0    [DISCONTINUED] sildenafil (Viagra) 100 mg tablet Take 1 tablet (100 mg) by mouth if needed.      [DISCONTINUED] fish oil concentrate (Omega-3) 120-180 mg capsule Take by mouth. (Patient not taking: Reported on 5/29/2025)      [DISCONTINUED] olmesartan (BENIcar) 20 mg tablet TAKE 1 TABLET ONCE DAILY (Patient not taking: Reported on 5/29/2025) 90 tablet 0    [DISCONTINUED] pantoprazole (ProtoNix) 40 mg EC tablet Take 1 tablet (40 mg) by mouth once daily in the morning. Take before meals. Do not crush, chew, or split. (Patient not taking: Reported on 5/29/2025) 30 tablet 11      No current facility-administered medications on file prior to visit.   [2]   Past Medical History:  Diagnosis Date    Chronic maxillary sinusitis 12/18/2019    Left maxillary sinusitis    Contact with and (suspected) exposure to other viral communicable diseases 03/21/2020    Exposure to influenza    Diverticulitis     Dizziness and giddiness 01/14/2015    Lightheadedness    HTN (hypertension)     Myalgia, other site 06/26/2019    Buttock pain    Noninfective gastroenteritis and colitis, unspecified 05/23/2016    Chronic diarrhea of unknown origin    Other forms of dyspnea 02/15/2019    Dyspnea on exertion    Other headache syndrome 01/14/2015    Chronic mixed headache syndrome    Other intervertebral disc displacement, lumbar region 12/11/2017    Lumbar herniated disc    Other microscopic hematuria 06/11/2020    Other microscopic hematuria    Pain in right ankle and joints of right foot 05/23/2016    Arthralgia of right foot    Personal history of other diseases of the musculoskeletal system and connective tissue 12/04/2017    History of low back pain    Personal history of other diseases of the nervous system and sense organs 10/07/2019    History of blurred vision    Personal history of other diseases of the nervous system and sense organs 11/21/2016    History of cataract    Personal history of other diseases of the respiratory system 10/07/2015    History of pharyngitis    Personal history of other specified conditions 06/09/2016    History of epigastric pain    Personal history of other specified conditions 02/16/2015    History of urinary frequency    Personal history of other specified conditions 01/26/2015    History of urinary retention    Personal history of other specified conditions 01/14/2015    History of memory loss    Personal history of other specified conditions 01/29/2020    History of vertigo    Radiculopathy, lumbar region 12/11/2017    Lumbar radiculitis    Sore throat 05/23/2025    Strain  of muscle and tendon of back wall of thorax, initial encounter 07/22/2014    Strain of thoracic spine    Syncope and collapse 10/09/2019    Near syncope   [3]   Past Surgical History:  Procedure Laterality Date    COLONOSCOPY      11/1/24    HERNIA REPAIR  06/21/2013    Hernia Repair Inguinal Sliding   [4] No family history on file.

## 2025-05-29 NOTE — ASSESSMENT & PLAN NOTE
Patient to maintain routine follow-up with dermatology for continued evaluation purposes due to history of precancerous skin lesion

## 2025-05-29 NOTE — ASSESSMENT & PLAN NOTE
Mood reportedly stable on current Wellbutrin dosing.  Good support system reported overall.  Provider to be notified for any persistent/worsening mood concerns.

## 2025-05-29 NOTE — ASSESSMENT & PLAN NOTE
Appears stable on current medication regiment.  Will continue to monitor patient's vital signs routinely for surveillance purposes.  Patient to notify office for any new cardiac concerns

## 2025-05-29 NOTE — ASSESSMENT & PLAN NOTE
Reportedly stable on current allopurinol dosing.  Provider to be notified for any new/worsening gouty flares

## 2025-05-29 NOTE — ASSESSMENT & PLAN NOTE
Routine blood work ordered today for surveillance purposes.  Will continue to monitor as appropriate.    -Most recent colonoscopy completed in 2024 with recommendations for repeat screening in 5 years (10/31/2029)

## 2025-05-29 NOTE — PATIENT INSTRUCTIONS
Orders are in place for you to have fasting blood work completed. Please do not eat or drink 8 hours prior to having your blood drawn.   You may have your blood work completed in this building through [facility] ([address], [address], [place] [address]).  For this location, you may schedule an appointment online or drop-in for walk-in services. [email or web address]  Hours:   Monday - Friday: 7:30 a.m. - 5 p.m. and Saturday: 8 a.m. - 11 a.m.  Phone:  [phone]

## 2025-05-30 DIAGNOSIS — K21.9 GASTROESOPHAGEAL REFLUX DISEASE, UNSPECIFIED WHETHER ESOPHAGITIS PRESENT: ICD-10-CM

## 2025-05-30 DIAGNOSIS — I10 BENIGN ESSENTIAL HYPERTENSION: ICD-10-CM

## 2025-05-30 DIAGNOSIS — F41.9 ANXIETY: ICD-10-CM

## 2025-05-30 DIAGNOSIS — E78.5 HYPERLIPIDEMIA, UNSPECIFIED HYPERLIPIDEMIA TYPE: ICD-10-CM

## 2025-05-30 DIAGNOSIS — F32.1 CURRENT MODERATE EPISODE OF MAJOR DEPRESSIVE DISORDER WITHOUT PRIOR EPISODE (MULTI): ICD-10-CM

## 2025-05-30 DIAGNOSIS — S39.012A STRAIN OF LUMBAR REGION, INITIAL ENCOUNTER: ICD-10-CM

## 2025-05-30 LAB
25(OH)D3+25(OH)D2 SERPL-MCNC: 39 NG/ML (ref 30–100)
ALBUMIN SERPL-MCNC: 4.6 G/DL (ref 3.6–5.1)
ALP SERPL-CCNC: 65 U/L (ref 35–144)
ALT SERPL-CCNC: 21 U/L (ref 9–46)
ANION GAP SERPL CALCULATED.4IONS-SCNC: 10 MMOL/L (CALC) (ref 7–17)
AST SERPL-CCNC: 22 U/L (ref 10–35)
BASOPHILS # BLD AUTO: 63 CELLS/UL (ref 0–200)
BASOPHILS NFR BLD AUTO: 0.9 %
BILIRUB SERPL-MCNC: 0.4 MG/DL (ref 0.2–1.2)
BUN SERPL-MCNC: 13 MG/DL (ref 7–25)
CALCIUM SERPL-MCNC: 9.4 MG/DL (ref 8.6–10.3)
CHLORIDE SERPL-SCNC: 105 MMOL/L (ref 98–110)
CHOLEST SERPL-MCNC: 144 MG/DL
CHOLEST/HDLC SERPL: 3.7 (CALC)
CO2 SERPL-SCNC: 26 MMOL/L (ref 20–32)
CREAT SERPL-MCNC: 1 MG/DL (ref 0.7–1.35)
EGFRCR SERPLBLD CKD-EPI 2021: 85 ML/MIN/1.73M2
EOSINOPHIL # BLD AUTO: 294 CELLS/UL (ref 15–500)
EOSINOPHIL NFR BLD AUTO: 4.2 %
ERYTHROCYTE [DISTWIDTH] IN BLOOD BY AUTOMATED COUNT: 12.9 % (ref 11–15)
EST. AVERAGE GLUCOSE BLD GHB EST-MCNC: 123 MG/DL
EST. AVERAGE GLUCOSE BLD GHB EST-SCNC: 6.8 MMOL/L
GLUCOSE SERPL-MCNC: 96 MG/DL (ref 65–99)
HBA1C MFR BLD: 5.9 %
HCT VFR BLD AUTO: 47.1 % (ref 38.5–50)
HDLC SERPL-MCNC: 39 MG/DL
HGB BLD-MCNC: 15.9 G/DL (ref 13.2–17.1)
LDLC SERPL CALC-MCNC: 72 MG/DL (CALC)
LYMPHOCYTES # BLD AUTO: 2121 CELLS/UL (ref 850–3900)
LYMPHOCYTES NFR BLD AUTO: 30.3 %
MCH RBC QN AUTO: 31.8 PG (ref 27–33)
MCHC RBC AUTO-ENTMCNC: 33.8 G/DL (ref 32–36)
MCV RBC AUTO: 94.2 FL (ref 80–100)
MONOCYTES # BLD AUTO: 721 CELLS/UL (ref 200–950)
MONOCYTES NFR BLD AUTO: 10.3 %
NEUTROPHILS # BLD AUTO: 3801 CELLS/UL (ref 1500–7800)
NEUTROPHILS NFR BLD AUTO: 54.3 %
NONHDLC SERPL-MCNC: 105 MG/DL (CALC)
PLATELET # BLD AUTO: 200 THOUSAND/UL (ref 140–400)
PMV BLD REES-ECKER: 10.6 FL (ref 7.5–12.5)
POTASSIUM SERPL-SCNC: 4.2 MMOL/L (ref 3.5–5.3)
PROT SERPL-MCNC: 6.9 G/DL (ref 6.1–8.1)
PSA SERPL-MCNC: 0.97 NG/ML
RBC # BLD AUTO: 5 MILLION/UL (ref 4.2–5.8)
SODIUM SERPL-SCNC: 141 MMOL/L (ref 135–146)
TRIGL SERPL-MCNC: 248 MG/DL
TSH SERPL-ACNC: 2.91 MIU/L (ref 0.4–4.5)
URATE SERPL-MCNC: 4.6 MG/DL (ref 4–8)
WBC # BLD AUTO: 7 THOUSAND/UL (ref 3.8–10.8)

## 2025-05-30 RX ORDER — CARVEDILOL 12.5 MG/1
12.5 TABLET ORAL 2 TIMES DAILY
Qty: 180 TABLET | Refills: 2 | Status: SHIPPED | OUTPATIENT
Start: 2025-05-30

## 2025-06-02 PROBLEM — R07.89 ATYPICAL CHEST PAIN: Status: RESOLVED | Noted: 2023-04-17 | Resolved: 2025-06-02

## 2025-10-06 ENCOUNTER — APPOINTMENT (OUTPATIENT)
Dept: PRIMARY CARE | Facility: CLINIC | Age: 65
End: 2025-10-06
Payer: COMMERCIAL